# Patient Record
Sex: MALE | Race: WHITE | Employment: UNEMPLOYED | URBAN - METROPOLITAN AREA
[De-identification: names, ages, dates, MRNs, and addresses within clinical notes are randomized per-mention and may not be internally consistent; named-entity substitution may affect disease eponyms.]

---

## 2017-08-16 ENCOUNTER — OFFICE VISIT (OUTPATIENT)
Dept: INTERNAL MEDICINE CLINIC | Age: 82
End: 2017-08-16

## 2017-08-16 VITALS
WEIGHT: 214 LBS | HEART RATE: 68 BPM | BODY MASS INDEX: 29.02 KG/M2 | DIASTOLIC BLOOD PRESSURE: 80 MMHG | TEMPERATURE: 98.3 F | SYSTOLIC BLOOD PRESSURE: 124 MMHG

## 2017-08-16 DIAGNOSIS — J06.9 URI WITH COUGH AND CONGESTION: Primary | ICD-10-CM

## 2017-08-16 PROCEDURE — G8419 CALC BMI OUT NRM PARAM NOF/U: HCPCS | Performed by: INTERNAL MEDICINE

## 2017-08-16 PROCEDURE — G8428 CUR MEDS NOT DOCUMENT: HCPCS | Performed by: INTERNAL MEDICINE

## 2017-08-16 PROCEDURE — 99213 OFFICE O/P EST LOW 20 MIN: CPT | Performed by: INTERNAL MEDICINE

## 2017-08-16 PROCEDURE — 1123F ACP DISCUSS/DSCN MKR DOCD: CPT | Performed by: INTERNAL MEDICINE

## 2017-08-16 PROCEDURE — 4040F PNEUMOC VAC/ADMIN/RCVD: CPT | Performed by: INTERNAL MEDICINE

## 2017-08-16 PROCEDURE — 1036F TOBACCO NON-USER: CPT | Performed by: INTERNAL MEDICINE

## 2017-08-16 RX ORDER — FLUTICASONE PROPIONATE 50 MCG
2 SPRAY, SUSPENSION (ML) NASAL DAILY
Qty: 1 BOTTLE | Refills: 3 | Status: SHIPPED | OUTPATIENT
Start: 2017-08-16 | End: 2017-12-22 | Stop reason: SDUPTHER

## 2017-08-16 RX ORDER — GUAIFENESIN 600 MG/1
600 TABLET, EXTENDED RELEASE ORAL 2 TIMES DAILY
COMMUNITY
Start: 2017-08-16 | End: 2017-12-22 | Stop reason: SDUPTHER

## 2017-08-16 RX ORDER — DEXTROMETHORPHAN HYDROBROMIDE AND PROMETHAZINE HYDROCHLORIDE 15; 6.25 MG/5ML; MG/5ML
5 SYRUP ORAL EVERY 4 HOURS PRN
Qty: 120 ML | Refills: 0 | Status: SHIPPED | OUTPATIENT
Start: 2017-08-16 | End: 2017-08-23

## 2017-12-22 ENCOUNTER — OFFICE VISIT (OUTPATIENT)
Dept: INTERNAL MEDICINE CLINIC | Age: 82
End: 2017-12-22

## 2017-12-22 VITALS
DIASTOLIC BLOOD PRESSURE: 66 MMHG | BODY MASS INDEX: 29.84 KG/M2 | WEIGHT: 220 LBS | SYSTOLIC BLOOD PRESSURE: 130 MMHG | TEMPERATURE: 99.3 F | HEART RATE: 65 BPM

## 2017-12-22 DIAGNOSIS — J01.90 ACUTE SINUSITIS, RECURRENCE NOT SPECIFIED, UNSPECIFIED LOCATION: Primary | ICD-10-CM

## 2017-12-22 DIAGNOSIS — J40 BRONCHITIS: ICD-10-CM

## 2017-12-22 PROCEDURE — 1123F ACP DISCUSS/DSCN MKR DOCD: CPT | Performed by: INTERNAL MEDICINE

## 2017-12-22 PROCEDURE — G8427 DOCREV CUR MEDS BY ELIG CLIN: HCPCS | Performed by: INTERNAL MEDICINE

## 2017-12-22 PROCEDURE — 4040F PNEUMOC VAC/ADMIN/RCVD: CPT | Performed by: INTERNAL MEDICINE

## 2017-12-22 PROCEDURE — 99213 OFFICE O/P EST LOW 20 MIN: CPT | Performed by: INTERNAL MEDICINE

## 2017-12-22 PROCEDURE — 1036F TOBACCO NON-USER: CPT | Performed by: INTERNAL MEDICINE

## 2017-12-22 PROCEDURE — G8419 CALC BMI OUT NRM PARAM NOF/U: HCPCS | Performed by: INTERNAL MEDICINE

## 2017-12-22 PROCEDURE — G8484 FLU IMMUNIZE NO ADMIN: HCPCS | Performed by: INTERNAL MEDICINE

## 2017-12-22 RX ORDER — HYDROCODONE POLISTIREX AND CHLORPHENIRAMINE POLISTIREX 10; 8 MG/5ML; MG/5ML
5 SUSPENSION, EXTENDED RELEASE ORAL EVERY 12 HOURS PRN
Qty: 60 ML | Refills: 0 | Status: SHIPPED | OUTPATIENT
Start: 2017-12-22 | End: 2017-12-28

## 2017-12-22 RX ORDER — DOXYCYCLINE HYCLATE 100 MG
100 TABLET ORAL 2 TIMES DAILY
Qty: 20 TABLET | Refills: 0 | Status: SHIPPED | OUTPATIENT
Start: 2017-12-22 | End: 2018-01-01

## 2017-12-22 RX ORDER — GUAIFENESIN, PSEUDOEPHEDRINE HYDROCHLORIDE 600; 60 MG/1; MG/1
1 TABLET, EXTENDED RELEASE ORAL 2 TIMES DAILY
Refills: 1 | COMMUNITY
Start: 2017-12-22 | End: 2017-12-29

## 2017-12-22 RX ORDER — ECHINACEA PURPUREA EXTRACT 125 MG
3 TABLET ORAL 3 TIMES DAILY
Qty: 1 BOTTLE | Refills: 3 | COMMUNITY
Start: 2017-12-22 | End: 2018-11-19

## 2017-12-22 RX ORDER — FLUTICASONE PROPIONATE 50 MCG
2 SPRAY, SUSPENSION (ML) NASAL DAILY
Qty: 1 BOTTLE | Refills: 3 | COMMUNITY
Start: 2017-12-22 | End: 2018-11-19

## 2017-12-22 NOTE — PROGRESS NOTES
[facility]) Physicians  Internal Medicine  Patient Encounter  Sha Elizondo D.O., [place] [facility]        Chief Complaint   Patient presents with    Cough    Chest Congestion       HPI: 80 y.o. male who was originally scheduled for his Medicare annual wellness visit is seen today with complaint of cough and chest congestion. Symptoms started 1 week ago initially with a sore throat, sneezing, runny nose, nasal congestion. He started coughing a few days later. He is coughing up a light brown, thick sputum. He does report associated rattling in the chest particularly at night in bed. He denies fever or chills. He tried Mucinex D, Flonase, Robitussin DM. Past Medical History:   Diagnosis Date    Basal cell cancer     nose, face    Cataract     Colon polyps     Dilated aortic root (HCC)     Hyperlipidemia     Hypertension     Lumbar disc disease     Lumbar spinal stenosis     Lumbar spondylosis     Prostate cancer ([facility] [address] 4/1997    Renal cancer ([facility] [address] 11/2009    Sleep disturbance     Thoracic aneurysm     Found to be NL on CT scan 2012. Review of Systems - As per HPI      OBJECTIVE:  /66   Pulse 65   Temp 99.3 °F (37.4 °C)   Wt 220 lb (99.8 kg)   BMI 29.84 kg/m²   GEN: NAD, A&O, Non-toxic  HEENT: NC/AT,  TM's NL. Nasal mucosal congestion with a cloudy mucoid discharge noted on the left. No epistaxis. Oral cavity is clear with moist mucous membranes. No pharyngeal exudates. + postnasal drainage. NECK: Supple. No thyromegaly. LYMPH: No C/SC nodes. CV: S1 S2 NL, RRR. No murmurs, clicks or rubs. PULM: CTA, symmentric air exchange. No crackles or wheezing. Moist cough. EXT: No edema. ASSESSMENT[de-identified]  Asenat Dus was seen today for cough and chest congestion. Diagnoses and all orders for this visit:    Acute sinusitis, recurrence not specified, unspecified location  -     pseudoephedrine-guaiFENesin (MUCINEX D)  MG per extended release tablet;  Take 1 tablet by mouth 2 times daily for 7 days  -     fluticasone (FLONASE) 50 MCG/ACT nasal spray; 2 sprays by Nasal route daily  -     sodium chloride (OCEAN) 0.65 % nasal spray; 3 sprays by Nasal route three times daily  -     doxycycline hyclate (VIBRA-TABS) 100 MG tablet; Take 1 tablet by mouth 2 times daily for 10 days    Bronchitis  -     pseudoephedrine-guaiFENesin (MUCINEX D)  MG per extended release tablet; Take 1 tablet by mouth 2 times daily for 7 days  -     doxycycline hyclate (VIBRA-TABS) 100 MG tablet; Take 1 tablet by mouth 2 times daily for 10 days  -     hydrocodone-chlorpheniramine ([facility]) 10-8 MG/5ML SUER; Take 5 mLs by mouth every 12 hours as needed (cough) . Additional Plan:  1. Advised patient to stay well hydrated and to monitor blood pressure while on the Mucinex D  2. Cautioned patient on the potential adverse side effects of the Tussionex cough syrup along with the habit forming nature of the medication. We also discussed the potential for misuse and abuse. 3.  Return for Medicare annual wellness visit      Discussed medications with patient who voiced understanding of their use, indication and potential side effects. Pt also understands the above recommendations. All questions answered.

## 2017-12-22 NOTE — PATIENT INSTRUCTIONS
teaspoon of salt and 1 teaspoon of baking soda to 2 cups of distilled water. If you make your own, fill a bulb syringe with the solution, insert the tip into your nostril, and squeeze gently. Macy Lark your nose. · Put a hot, wet towel or a warm gel pack on your face 3 or 4 times a day for 5 to 10 minutes each time. · Try a decongestant nasal spray like oxymetazoline (Afrin). Do not use it for more than 3 days in a row. Using it for more than 3 days can make your congestion worse. When should you call for help? Call your doctor now or seek immediate medical care if:  ? · You have new or worse swelling or redness in your face or around your eyes. ? · You have a new or higher fever. ? Watch closely for changes in your health, and be sure to contact your doctor if:  ? · You have new or worse facial pain. ? · The mucus from your nose becomes thicker (like pus) or has new blood in it. ? · You are not getting better as expected. Where can you learn more? Go to [email or web address] Inventory [email or web address] and sign in to your Wonga account. Enter J705 in the Bar Saint box to learn more about \"Sinusitis: Care Instructions. \"     If you do not have an account, please click on the \"Sign Up Now\" link. Current as of: May 12, 2017  Content Version: 11.4  © 7492-2979 [employer]atch Resources. Care instructions adapted under license by [facility] ([facility]). If you have questions about a medical condition or this instruction, always ask your healthcare professional. [place] [address] any warranty or liability for your use of this information. Patient Education        Bronchitis: Care Instructions  Your Care Instructions    Bronchitis is inflammation of the bronchial tubes, which carry air to the lungs. The tubes swell and produce mucus, or phlegm. The mucus and inflamed bronchial tubes make you cough. You may have trouble breathing.   Most cases of bronchitis are caused by viruses like those that cause colds. Antibiotics usually do not help and they may be harmful. Bronchitis usually develops rapidly and lasts about 2 to 3 weeks in otherwise healthy people. Follow-up care is a key part of your treatment and safety. Be sure to make and go to all appointments, and call your doctor if you are having problems. It's also a good idea to know your test results and keep a list of the medicines you take. How can you care for yourself at home? · Take all medicines exactly as prescribed. Call your doctor if you think you are having a problem with your medicine. · Get some extra rest.  · Take an over-the-counter pain medicine, such as acetaminophen (Tylenol), ibuprofen (Advil, Motrin), or naproxen (Aleve) to reduce fever and relieve body aches. Read and follow all instructions on the label. · Do not take two or more pain medicines at the same time unless the doctor told you to. Many pain medicines have acetaminophen, which is Tylenol. Too much acetaminophen (Tylenol) can be harmful. · Take an over-the-counter cough medicine that contains dextromethorphan to help quiet a dry, hacking cough so that you can sleep. Avoid cough medicines that have more than one active ingredient. Read and follow all instructions on the label. · Breathe moist air from a humidifier, hot shower, or sink filled with hot water. The heat and moisture will thin mucus so you can cough it out. · Do not smoke. Smoking can make bronchitis worse. If you need help quitting, talk to your doctor about stop-smoking programs and medicines. These can increase your chances of quitting for good. When should you call for help? Call 911 anytime you think you may need emergency care. For example, call if:  ? · You have severe trouble breathing. ?Call your doctor now or seek immediate medical care if:  ? · You have new or worse trouble breathing. ? · You cough up dark brown or bloody mucus (sputum). ? · You have a new or higher fever.    ? · You have a new rash. ? Watch closely for changes in your health, and be sure to contact your doctor if:  ? · You cough more deeply or more often, especially if you notice more mucus or a change in the color of your mucus. ? · You are not getting better as expected. Where can you learn more? Go to [email or web address] and sign in to your Ecoviate account. Enter H333 in the Constant Insight box to learn more about \"Bronchitis: Care Instructions. \"     If you do not have an account, please click on the \"Sign Up Now\" link. Current as of: May 12, 2017  Content Version: 11.4  © 5089-5867 Healthwise, Incorporated. Care instructions adapted under license by [facility] ([facility]). If you have questions about a medical condition or this instruction, always ask your healthcare professional. [address] any warranty or liability for your use of this information.

## 2018-03-06 RX ORDER — AMLODIPINE BESYLATE 5 MG/1
TABLET ORAL
Qty: 90 TABLET | Refills: 3 | Status: SHIPPED | OUTPATIENT
Start: 2018-03-06 | End: 2019-02-02 | Stop reason: SDUPTHER

## 2018-04-23 ENCOUNTER — TELEPHONE (OUTPATIENT)
Dept: INTERNAL MEDICINE CLINIC | Age: 83
End: 2018-04-23

## 2018-05-01 ENCOUNTER — OFFICE VISIT (OUTPATIENT)
Dept: INTERNAL MEDICINE CLINIC | Age: 83
End: 2018-05-01

## 2018-05-01 ENCOUNTER — TELEPHONE (OUTPATIENT)
Dept: INTERNAL MEDICINE CLINIC | Age: 83
End: 2018-05-01

## 2018-05-01 VITALS — DIASTOLIC BLOOD PRESSURE: 80 MMHG | SYSTOLIC BLOOD PRESSURE: 140 MMHG

## 2018-05-01 DIAGNOSIS — M48.062 SPINAL STENOSIS OF LUMBAR REGION WITH NEUROGENIC CLAUDICATION: ICD-10-CM

## 2018-05-01 DIAGNOSIS — M54.16 LUMBAR RADICULOPATHY: Primary | ICD-10-CM

## 2018-05-01 DIAGNOSIS — Z85.46 HISTORY OF PROSTATE CANCER: ICD-10-CM

## 2018-05-01 DIAGNOSIS — Z85.528 HISTORY OF KIDNEY CANCER: ICD-10-CM

## 2018-05-01 DIAGNOSIS — M51.36 DDD (DEGENERATIVE DISC DISEASE), LUMBAR: ICD-10-CM

## 2018-05-01 PROCEDURE — 4040F PNEUMOC VAC/ADMIN/RCVD: CPT | Performed by: INTERNAL MEDICINE

## 2018-05-01 PROCEDURE — G8419 CALC BMI OUT NRM PARAM NOF/U: HCPCS | Performed by: INTERNAL MEDICINE

## 2018-05-01 PROCEDURE — G8428 CUR MEDS NOT DOCUMENT: HCPCS | Performed by: INTERNAL MEDICINE

## 2018-05-01 PROCEDURE — 1036F TOBACCO NON-USER: CPT | Performed by: INTERNAL MEDICINE

## 2018-05-01 PROCEDURE — 99213 OFFICE O/P EST LOW 20 MIN: CPT | Performed by: INTERNAL MEDICINE

## 2018-05-01 PROCEDURE — 1123F ACP DISCUSS/DSCN MKR DOCD: CPT | Performed by: INTERNAL MEDICINE

## 2018-05-10 ENCOUNTER — TELEPHONE (OUTPATIENT)
Dept: INTERNAL MEDICINE CLINIC | Age: 83
End: 2018-05-10

## 2018-05-10 DIAGNOSIS — M54.16 LUMBAR RADICULOPATHY: ICD-10-CM

## 2018-05-10 DIAGNOSIS — Z85.528 HISTORY OF KIDNEY CANCER: ICD-10-CM

## 2018-05-10 DIAGNOSIS — M51.36 DDD (DEGENERATIVE DISC DISEASE), LUMBAR: ICD-10-CM

## 2018-05-10 DIAGNOSIS — M48.062 SPINAL STENOSIS OF LUMBAR REGION WITH NEUROGENIC CLAUDICATION: ICD-10-CM

## 2018-05-10 DIAGNOSIS — Z85.46 HISTORY OF PROSTATE CANCER: ICD-10-CM

## 2018-05-11 DIAGNOSIS — M51.36 DDD (DEGENERATIVE DISC DISEASE), LUMBAR: ICD-10-CM

## 2018-05-11 DIAGNOSIS — M48.061 LUMBAR FORAMINAL STENOSIS: ICD-10-CM

## 2018-05-11 DIAGNOSIS — M47.816 LUMBAR FACET ARTHROPATHY: ICD-10-CM

## 2018-05-11 DIAGNOSIS — M54.17 LUMBOSACRAL RADICULOPATHY AT L3: Primary | ICD-10-CM

## 2018-07-10 ENCOUNTER — OFFICE VISIT (OUTPATIENT)
Dept: INTERNAL MEDICINE CLINIC | Age: 83
End: 2018-07-10

## 2018-07-10 VITALS
OXYGEN SATURATION: 97 % | HEART RATE: 62 BPM | DIASTOLIC BLOOD PRESSURE: 70 MMHG | WEIGHT: 215 LBS | BODY MASS INDEX: 29.16 KG/M2 | SYSTOLIC BLOOD PRESSURE: 132 MMHG

## 2018-07-10 DIAGNOSIS — E78.5 HYPERLIPIDEMIA, UNSPECIFIED HYPERLIPIDEMIA TYPE: ICD-10-CM

## 2018-07-10 DIAGNOSIS — Z23 NEED FOR SHINGLES VACCINE: ICD-10-CM

## 2018-07-10 DIAGNOSIS — K21.9 GASTROESOPHAGEAL REFLUX DISEASE WITHOUT ESOPHAGITIS: Primary | ICD-10-CM

## 2018-07-10 DIAGNOSIS — R13.19 ESOPHAGEAL DYSPHAGIA: ICD-10-CM

## 2018-07-10 DIAGNOSIS — K59.00 CONSTIPATION, UNSPECIFIED CONSTIPATION TYPE: ICD-10-CM

## 2018-07-10 DIAGNOSIS — I10 BENIGN ESSENTIAL HTN: ICD-10-CM

## 2018-07-10 PROCEDURE — 1123F ACP DISCUSS/DSCN MKR DOCD: CPT | Performed by: INTERNAL MEDICINE

## 2018-07-10 PROCEDURE — G8427 DOCREV CUR MEDS BY ELIG CLIN: HCPCS | Performed by: INTERNAL MEDICINE

## 2018-07-10 PROCEDURE — 1036F TOBACCO NON-USER: CPT | Performed by: INTERNAL MEDICINE

## 2018-07-10 PROCEDURE — 99214 OFFICE O/P EST MOD 30 MIN: CPT | Performed by: INTERNAL MEDICINE

## 2018-07-10 PROCEDURE — 4040F PNEUMOC VAC/ADMIN/RCVD: CPT | Performed by: INTERNAL MEDICINE

## 2018-07-10 PROCEDURE — G8510 SCR DEP NEG, NO PLAN REQD: HCPCS | Performed by: INTERNAL MEDICINE

## 2018-07-10 PROCEDURE — G8419 CALC BMI OUT NRM PARAM NOF/U: HCPCS | Performed by: INTERNAL MEDICINE

## 2018-07-10 PROCEDURE — 3288F FALL RISK ASSESSMENT DOCD: CPT | Performed by: INTERNAL MEDICINE

## 2018-07-10 RX ORDER — DOCUSATE SODIUM 100 MG/1
200 CAPSULE, LIQUID FILLED ORAL DAILY
COMMUNITY
Start: 2018-07-10 | End: 2020-12-04

## 2018-07-10 RX ORDER — FAMOTIDINE 20 MG/1
40 TABLET, FILM COATED ORAL DAILY
Qty: 60 TABLET | Refills: 3 | Status: SHIPPED | OUTPATIENT
Start: 2018-07-10 | End: 2018-11-19 | Stop reason: ALTCHOICE

## 2018-07-10 ASSESSMENT — PATIENT HEALTH QUESTIONNAIRE - PHQ9
SUM OF ALL RESPONSES TO PHQ9 QUESTIONS 1 & 2: 0
1. LITTLE INTEREST OR PLEASURE IN DOING THINGS: 0
SUM OF ALL RESPONSES TO PHQ QUESTIONS 1-9: 0
2. FEELING DOWN, DEPRESSED OR HOPELESS: 0

## 2018-07-10 NOTE — PROGRESS NOTES
hepatomegaly. NEURO: No focal or lateralizing deficits. VASC:  No carotid bruits. Pulses symmetric    ASSESSMENT[de-identified]  Ricardo Kerr was seen today for gastroesophageal reflux, constipation and other. Diagnoses and all orders for this visit:    Gastroesophageal reflux disease without esophagitis  -     Ambulatory referral to Gastroenterology  -     famotidine (PEPCID) 20 MG tablet; Take 2 tablets by mouth daily    Esophageal dysphagia  -     Ambulatory referral to Gastroenterology  -     famotidine (PEPCID) 20 MG tablet; Take 2 tablets by mouth daily    Benign essential HTN  -     CBC Auto Differential; Future  -     Comprehensive Metabolic Panel; Future  -     Lipid Panel; Future  -     TSH without Reflex; Future    Need for shingles vaccine  -     zoster recombinant adjuvanted vaccine (SHINGRIX) 50 MCG SUSR injection; Inject 0.5 mLs into the muscle once for 1 dose . Repeat in 2-6 months    Constipation, unspecified constipation type  -     docusate sodium (COLACE) 100 MG capsule; Take 2 capsules by mouth daily  -     TSH without Reflex; Future    Hyperlipidemia, unspecified hyperlipidemia type  -     CBC Auto Differential; Future  -     Comprehensive Metabolic Panel; Future  -     Lipid Panel; Future  -     TSH without Reflex; Future        ? Colonoscopy    Discussed medications with patient who voiced understanding of their use, indication and potential side effects. Pt also understands the above recommendations. All questions answered.

## 2018-07-10 NOTE — PATIENT INSTRUCTIONS
Patient Education        Constipation: Care Instructions  Your Care Instructions    Constipation means that you have a hard time passing stools (bowel movements). People pass stools from 3 times a day to once every 3 days. What is normal for you may be different. Constipation may occur with pain in the rectum and cramping. The pain may get worse when you try to pass stools. Sometimes there are small amounts of bright red blood on toilet paper or the surface of stools. This is because of enlarged veins near the rectum (hemorrhoids). A few changes in your diet and lifestyle may help you avoid ongoing constipation. Your doctor may also prescribe medicine to help loosen your stool. Some medicines can cause constipation. These include pain medicines and antidepressants. Tell your doctor about all the medicines you take. Your doctor may want to make a medicine change to ease your symptoms. Follow-up care is a key part of your treatment and safety. Be sure to make and go to all appointments, and call your doctor if you are having problems. It's also a good idea to know your test results and keep a list of the medicines you take. How can you care for yourself at home? · Drink plenty of fluids, enough so that your urine is light yellow or clear like water. If you have kidney, heart, or liver disease and have to limit fluids, talk with your doctor before you increase the amount of fluids you drink. · Include high-fiber foods in your diet each day. These include fruits, vegetables, beans, and whole grains. · Get at least 30 minutes of exercise on most days of the week. Walking is a good choice. You also may want to do other activities, such as running, swimming, cycling, or playing tennis or team sports. · Take a fiber supplement, such as Citrucel or Metamucil, every day. Read and follow all instructions on the label. · Schedule time each day for a bowel movement. A daily routine may help.  Take your time having your bowel movement. · Support your feet with a small step stool when you sit on the toilet. This helps flex your hips and places your pelvis in a squatting position. · Your doctor may recommend an over-the-counter laxative to relieve your constipation. Examples are Milk of Magnesia and MiraLax. Read and follow all instructions on the label. Do not use laxatives on a long-term basis. When should you call for help? Call your doctor now or seek immediate medical care if:    · You have new or worse belly pain.     · You have new or worse nausea or vomiting.     · You have blood in your stools.    Watch closely for changes in your health, and be sure to contact your doctor if:    · Your constipation is getting worse.     · You do not get better as expected. Where can you learn more? Go to [email or web address]://[email or web address].[email or web address] and sign in to your Idibon account. Enter [phone] in the Lombardi Software box to learn more about \"Constipation: Care Instructions. \"     If you do not have an account, please click on the \"Sign Up Now\" link. Current as of: November 20, 2017  Content Version: 11.6  © 0157-6901 Backupify. Care instructions adapted under license by [facility] ([facility]). If you have questions about a medical condition or this instruction, always ask your healthcare professional. [place] [address] any warranty or liability for your use of this information. Patient Education        Learning About Swallowing Problems  What are swallowing problems? Certain health problems that affect the nervous system can cause trouble swallowing. These conditions include stroke, ALS (also known as Amaya Gehrig's disease), Parkinson's disease, and multiple sclerosis. The muscles and nerves that help move food through the throat and esophagus may not work right. Growths, such as cancer, and other problems with your esophagus can also make it hard to swallow.  The esophagus is the tube that leads frequent meals. · Tip your chin down when there is food in your mouth. Where can you learn more? Go to https://chpepiceweb.[email or web address] org and sign in to your Compliance Control account. Enter M570 in the The Daily Caller box to learn more about \"Learning About Swallowing Problems. \"     If you do not have an account, please click on the \"Sign Up Now\" link. Current as of: November 20, 2017  Content Version: 11.6  © 1514-7490 [facility]. Care instructions adapted under license by [facility] ([facility]). If you have questions about a medical condition or this instruction, always ask your healthcare professional. [place] [address] any warranty or liability for your use of this information. Patient Education        Gastroesophageal Reflux Disease (GERD): Care Instructions  Your Care Instructions    Gastroesophageal reflux disease (GERD) is the backward flow of stomach acid into the esophagus. The esophagus is the tube that leads from your throat to your stomach. A one-way valve prevents the stomach acid from moving up into this tube. When you have GERD, this valve does not close tightly enough. If you have mild GERD symptoms including heartburn, you may be able to control the problem with antacids or over-the-counter medicine. Changing your diet, losing weight, and making other lifestyle changes can also help reduce symptoms. Follow-up care is a key part of your treatment and safety. Be sure to make and go to all appointments, and call your doctor if you are having problems. It's also a good idea to know your test results and keep a list of the medicines you take. How can you care for yourself at home? · Take your medicines exactly as prescribed. Call your doctor if you think you are having a problem with your medicine. · Your doctor may recommend over-the-counter medicine. For mild or occasional indigestion, antacids, such as Tums, Gaviscon, Mylanta, or Maalox, may help.  Your doctor 2017  Content Version: 11.6  © 0771-7028 M-SIX, Incorporated. Care instructions adapted under license by [facility] ([facility]). If you have questions about a medical condition or this instruction, always ask your healthcare professional. [address] any warranty or liability for your use of this information.

## 2018-07-11 DIAGNOSIS — K59.00 CONSTIPATION, UNSPECIFIED CONSTIPATION TYPE: ICD-10-CM

## 2018-07-11 DIAGNOSIS — E78.5 HYPERLIPIDEMIA, UNSPECIFIED HYPERLIPIDEMIA TYPE: ICD-10-CM

## 2018-07-11 DIAGNOSIS — I10 BENIGN ESSENTIAL HTN: ICD-10-CM

## 2018-07-11 LAB
A/G RATIO: 2 (ref 1.1–2.2)
ALBUMIN SERPL-MCNC: 4.4 G/DL (ref 3.4–5)
ALP BLD-CCNC: 55 U/L (ref 40–129)
ALT SERPL-CCNC: 16 U/L (ref 10–40)
ANION GAP SERPL CALCULATED.3IONS-SCNC: 10 MMOL/L (ref 3–16)
AST SERPL-CCNC: 18 U/L (ref 15–37)
BASOPHILS ABSOLUTE: 0 K/UL (ref 0–0.2)
BASOPHILS RELATIVE PERCENT: 0.7 %
BILIRUB SERPL-MCNC: 0.8 MG/DL (ref 0–1)
BUN BLDV-MCNC: 14 MG/DL (ref 7–20)
CALCIUM SERPL-MCNC: 9.5 MG/DL (ref 8.3–10.6)
CHLORIDE BLD-SCNC: 99 MMOL/L (ref 99–110)
CHOLESTEROL, TOTAL: 228 MG/DL (ref 0–199)
CO2: 29 MMOL/L (ref 21–32)
CREAT SERPL-MCNC: 0.9 MG/DL (ref 0.8–1.3)
EOSINOPHILS ABSOLUTE: 0.2 K/UL (ref 0–0.6)
EOSINOPHILS RELATIVE PERCENT: 3.4 %
GFR AFRICAN AMERICAN: >60
GFR NON-AFRICAN AMERICAN: >60
GLOBULIN: 2.2 G/DL
GLUCOSE BLD-MCNC: 91 MG/DL (ref 70–99)
HCT VFR BLD CALC: 40.9 % (ref 40.5–52.5)
HDLC SERPL-MCNC: 61 MG/DL (ref 40–60)
HEMOGLOBIN: 14.3 G/DL (ref 13.5–17.5)
LDL CHOLESTEROL CALCULATED: 141 MG/DL
LYMPHOCYTES ABSOLUTE: 2 K/UL (ref 1–5.1)
LYMPHOCYTES RELATIVE PERCENT: 28.5 %
MCH RBC QN AUTO: 32.4 PG (ref 26–34)
MCHC RBC AUTO-ENTMCNC: 35 G/DL (ref 31–36)
MCV RBC AUTO: 92.6 FL (ref 80–100)
MONOCYTES ABSOLUTE: 0.5 K/UL (ref 0–1.3)
MONOCYTES RELATIVE PERCENT: 6.8 %
NEUTROPHILS ABSOLUTE: 4.4 K/UL (ref 1.7–7.7)
NEUTROPHILS RELATIVE PERCENT: 60.6 %
PDW BLD-RTO: 13.2 % (ref 12.4–15.4)
PLATELET # BLD: NORMAL K/UL (ref 135–450)
PLATELET SLIDE REVIEW: NORMAL
PMV BLD AUTO: NORMAL FL (ref 5–10.5)
POTASSIUM SERPL-SCNC: 4.6 MMOL/L (ref 3.5–5.1)
RBC # BLD: 4.42 M/UL (ref 4.2–5.9)
SLIDE REVIEW: NORMAL
SODIUM BLD-SCNC: 138 MMOL/L (ref 136–145)
TOTAL PROTEIN: 6.6 G/DL (ref 6.4–8.2)
TRIGL SERPL-MCNC: 130 MG/DL (ref 0–150)
TSH SERPL DL<=0.05 MIU/L-ACNC: 2.17 UIU/ML (ref 0.27–4.2)
VLDLC SERPL CALC-MCNC: 26 MG/DL
WBC # BLD: 7.2 K/UL (ref 4–11)

## 2018-11-19 ENCOUNTER — OFFICE VISIT (OUTPATIENT)
Dept: INTERNAL MEDICINE CLINIC | Age: 83
End: 2018-11-19
Payer: MEDICARE

## 2018-11-19 ENCOUNTER — TELEPHONE (OUTPATIENT)
Dept: INTERNAL MEDICINE CLINIC | Age: 83
End: 2018-11-19

## 2018-11-19 VITALS — WEIGHT: 219 LBS | SYSTOLIC BLOOD PRESSURE: 120 MMHG | BODY MASS INDEX: 29.7 KG/M2 | DIASTOLIC BLOOD PRESSURE: 80 MMHG

## 2018-11-19 DIAGNOSIS — E78.5 HYPERLIPIDEMIA, UNSPECIFIED HYPERLIPIDEMIA TYPE: ICD-10-CM

## 2018-11-19 DIAGNOSIS — I71.20 THORACIC AORTIC ANEURYSM WITHOUT RUPTURE: ICD-10-CM

## 2018-11-19 DIAGNOSIS — I10 BENIGN ESSENTIAL HTN: Primary | ICD-10-CM

## 2018-11-19 DIAGNOSIS — K22.2 ESOPHAGEAL STRICTURE: ICD-10-CM

## 2018-11-19 DIAGNOSIS — K21.9 GASTROESOPHAGEAL REFLUX DISEASE WITHOUT ESOPHAGITIS: ICD-10-CM

## 2018-11-19 PROCEDURE — 1101F PT FALLS ASSESS-DOCD LE1/YR: CPT | Performed by: INTERNAL MEDICINE

## 2018-11-19 PROCEDURE — 99214 OFFICE O/P EST MOD 30 MIN: CPT | Performed by: INTERNAL MEDICINE

## 2018-11-19 PROCEDURE — 1123F ACP DISCUSS/DSCN MKR DOCD: CPT | Performed by: INTERNAL MEDICINE

## 2018-11-19 PROCEDURE — G8484 FLU IMMUNIZE NO ADMIN: HCPCS | Performed by: INTERNAL MEDICINE

## 2018-11-19 PROCEDURE — G8419 CALC BMI OUT NRM PARAM NOF/U: HCPCS | Performed by: INTERNAL MEDICINE

## 2018-11-19 PROCEDURE — 1036F TOBACCO NON-USER: CPT | Performed by: INTERNAL MEDICINE

## 2018-11-19 PROCEDURE — 4040F PNEUMOC VAC/ADMIN/RCVD: CPT | Performed by: INTERNAL MEDICINE

## 2018-11-19 PROCEDURE — G8427 DOCREV CUR MEDS BY ELIG CLIN: HCPCS | Performed by: INTERNAL MEDICINE

## 2018-11-19 RX ORDER — PANTOPRAZOLE SODIUM 40 MG/1
40 TABLET, DELAYED RELEASE ORAL DAILY
COMMUNITY
End: 2018-11-19 | Stop reason: DRUGHIGH

## 2018-11-19 RX ORDER — PANTOPRAZOLE SODIUM 40 MG/1
40 TABLET, DELAYED RELEASE ORAL 2 TIMES DAILY
Qty: 30 TABLET | Status: SHIPPED | COMMUNITY
Start: 2018-11-19 | End: 2019-07-10

## 2018-11-19 NOTE — PROGRESS NOTES
Date    Basal cell cancer     nose, face    Cataract     Colon polyps     Dilated aortic root (HCC)     Gastroesophageal reflux disease without esophagitis 11/19/2018    Hyperlipidemia     Hypertension     Lumbar disc disease     Lumbar spinal stenosis     Lumbar spondylosis     Prostate cancer ([facility] [address] 4/1997    Renal cancer ([facility] 11/2009    Sleep disturbance     Thoracic aneurysm     Found to be NL on CT scan 2012. Prior to Visit Medications    Medication Sig Taking? Authorizing Provider   pantoprazole (PROTONIX) 40 MG tablet Take 1 tablet by mouth 2 times daily Yes Mikhail Gallardo, DO   docusate sodium (COLACE) 100 MG capsule Take 2 capsules by mouth daily Yes Mikhail Gallardo, DO   amLODIPine (NORVASC) 5 MG tablet TAKE 1 TABLET BY MOUTH  DAILY Yes Mikhail Gallardo, DO   aspirin 81 MG EC tablet Take 81 mg by mouth daily. Yes Historical Provider, MD           Review of Systems - As per HPI  GEN: Pt denies fever, chills or night sweats. Denies weight changes. Appetite good  HEENT: Pt denies visual and auditory changes, epistaxis, upper respiratory symptoms and dysphagia  CV: Pt denies CP, SOB, LEHMAN, orthopnea palpitations, LE swelling, and claudication. PULM: Pt denies cough, wheezing or sputum production  GI: Pt denies N/V/D, heart burn, rectal bleeding, or melena. NEURO: Pt denies unilateral weakness, paresthesia and dizziness. OBJECTIVE:  /80   Wt 219 lb (99.3 kg)   BMI 29.70 kg/m²   GEN: NAD, A&O, Non-toxic  HEENT: NC/AT, SOREN, EOMI, Oral cavity Clear,  TM's NL, Nasal cavity clear. NECK: Supple. No thyromegaly. LYMPH: No C/SC nodes. CV: S1 S2 NL, RRR. No murmurs, clicks or rubs. PULM: CTA, symmentric air exchange  EXT: No C/C/E  GI: Abdomen is soft, NT, BS+, No hepatomegaly. NEURO: No focal or lateralizing deficits. VASC:  No carotid bruits. Pulses symmetric    ASSESSMENT/PLAN:    1.  Benign essential HTN  Blood pressure is well controlled  Continue amlodipine  Stay

## 2019-04-03 ENCOUNTER — OFFICE VISIT (OUTPATIENT)
Dept: INTERNAL MEDICINE CLINIC | Age: 84
End: 2019-04-03
Payer: MEDICARE

## 2019-04-03 VITALS — HEART RATE: 62 BPM | DIASTOLIC BLOOD PRESSURE: 70 MMHG | TEMPERATURE: 98.2 F | SYSTOLIC BLOOD PRESSURE: 144 MMHG

## 2019-04-03 DIAGNOSIS — M48.061 LUMBAR FORAMINAL STENOSIS: ICD-10-CM

## 2019-04-03 DIAGNOSIS — K22.2 ESOPHAGEAL STRICTURE: ICD-10-CM

## 2019-04-03 DIAGNOSIS — M51.36 DDD (DEGENERATIVE DISC DISEASE), LUMBAR: ICD-10-CM

## 2019-04-03 DIAGNOSIS — K21.9 GASTROESOPHAGEAL REFLUX DISEASE WITHOUT ESOPHAGITIS: Primary | ICD-10-CM

## 2019-04-03 DIAGNOSIS — M54.17 LUMBOSACRAL RADICULOPATHY AT L3: ICD-10-CM

## 2019-04-03 DIAGNOSIS — M47.816 LUMBAR FACET ARTHROPATHY: ICD-10-CM

## 2019-04-03 DIAGNOSIS — R13.19 ESOPHAGEAL DYSPHAGIA: ICD-10-CM

## 2019-04-03 DIAGNOSIS — I71.20 THORACIC AORTIC ANEURYSM WITHOUT RUPTURE: ICD-10-CM

## 2019-04-03 PROCEDURE — 1036F TOBACCO NON-USER: CPT | Performed by: INTERNAL MEDICINE

## 2019-04-03 PROCEDURE — G8427 DOCREV CUR MEDS BY ELIG CLIN: HCPCS | Performed by: INTERNAL MEDICINE

## 2019-04-03 PROCEDURE — 4040F PNEUMOC VAC/ADMIN/RCVD: CPT | Performed by: INTERNAL MEDICINE

## 2019-04-03 PROCEDURE — 99214 OFFICE O/P EST MOD 30 MIN: CPT | Performed by: INTERNAL MEDICINE

## 2019-04-03 PROCEDURE — G8419 CALC BMI OUT NRM PARAM NOF/U: HCPCS | Performed by: INTERNAL MEDICINE

## 2019-04-03 PROCEDURE — 1123F ACP DISCUSS/DSCN MKR DOCD: CPT | Performed by: INTERNAL MEDICINE

## 2019-04-03 RX ORDER — OMEPRAZOLE AND SODIUM BICARBONATE 40; 1100 MG/1; MG/1
1 CAPSULE ORAL
Qty: 30 CAPSULE | Refills: 3 | Status: SHIPPED | OUTPATIENT
Start: 2019-04-03 | End: 2019-07-10

## 2019-04-03 NOTE — PROGRESS NOTES
upright. He is belching and regurgitating. The Manometry has not been set up. He feels worse since the dilation. He reports water brash, sour and bitter taste in the mouth. On his own, he stopped the Protonix. He took the Zegarid in the morning instead and this helped. Patient states he does not think he is going to go through with the manometry studies. He did undergo a colonoscopy on 10/1/2018. Also, he has additional complaints of right hip pain. Back in May of 2018. He had an CHTEAN. The latter helped with low back and radicular pain. Now, he is having right low back pain and radicular pain again in the right medial leg. He denies groin pain. He had seen Dr. Isha Shay. He has seen Dr. Sinan Oviedo did the procedure. He wants another referral to for another CHETAN. MRI from May 2018 reviewed. [phone]    Past Medical History:   Diagnosis Date    Basal cell cancer     nose, face    Cataract     Colon polyps     Dilated aortic root (HCC)     Gastroesophageal reflux disease without esophagitis 11/19/2018    Hyperlipidemia     Hypertension     Lumbar disc disease     Lumbar spinal stenosis     Lumbar spondylosis     Prostate cancer ([facility] [address] 4/1997    Renal cancer ([facility] [address] 11/2009    Sleep disturbance     Thoracic aneurysm     Found to be NL on CT scan 2012. MEDICATIONS:  Prior to Visit Medications    Medication Sig Taking? Authorizing Provider   omeprazole-sodium bicarbonate (ZEGERID)  MG per capsule Take 1 capsule by mouth every morning (before breakfast) Yes Mikhail Gallardo, DO   amLODIPine (NORVASC) 5 MG tablet TAKE 1 TABLET BY MOUTH  DAILY Yes Mikhail Gallardo DO   docusate sodium (COLACE) 100 MG capsule Take 2 capsules by mouth daily Yes Mikhail Gallardo DO   aspirin 81 MG EC tablet Take 81 mg by mouth daily.  Yes Historical Provider, MD   pantoprazole (PROTONIX) 40 MG tablet Take 1 tablet by mouth 2 times daily  Mikhail Gallardo, DO           Review of Systems - As per rehab for interventional pain management. Consider another epidural injection. 4.  Debility of the thoracic aneurysm is unknown. He is due for a CT scan. He wants to get his GI symptoms under control first    Discussed medications with patient who voiced understanding of their use, indication and potential side effects. Pt also understands the above recommendations. All questions answered.

## 2019-04-03 NOTE — PATIENT INSTRUCTIONS
Follow-up with GI for manometry studies. I strongly encourage you to forward with this evaluation. We are increasing the Zegerid to  one daily in the morning    Follow-up with Dr. Sendy Alves for another epidural injection. You may have to visit with Dr. Roland Jc or Dr. Joanne Alvarado first  Patient Education        Learning About Swallowing Problems  What are swallowing problems? Certain health problems that affect the nervous system can cause trouble swallowing. These conditions include stroke, ALS (also known as Amaya Gehrig's disease), Parkinson's disease, and multiple sclerosis. The muscles and nerves that help move food through the throat and esophagus may not work right. Growths, such as cancer, and other problems with your esophagus can also make it hard to swallow. The esophagus is the tube that leads from your throat to your stomach. How are swallowing problems diagnosed? A doctor or speech therapist will examine you to check for swallowing problems. You may get swallowing tests to check how well your throat muscles work. For these tests, you swallow a special liquid that helps the doctor see your throat and esophagus on an X-ray or video screen. Other tests use a thin, flexible tube called a scope to check for problems with your esophagus. The doctor puts the scope in your mouth and down your throat to look at your esophagus. What are the symptoms? Symptoms of swallowing problems may include:  · Trouble getting food or liquids to go down on the first try. · Gagging, choking, or coughing when you swallow. · Having food or liquids come back up through your throat, mouth, or nose after you swallow. · Feeling like foods or liquids are stuck in some part of your throat or chest.  · Pain when you swallow. How are swallowing problems treated? How swallowing problems are treated depends on the cause. The main goals of treatment will be to help you eat and swallow safely and get good nutrition. that move food through the throat and esophagus are not working right. To help you swallow food, your doctor or speech therapist may advise a special dysphagia diet for you. Why is a special diet important? A dysphagia diet can help you handle some problems that can occur when it's hard to swallow food and liquids easily. These problems can include:  · Malnutrition. This means you aren't getting enough healthy foods to keep your body working well. · Dehydration. This means you aren't getting enough liquids to keep your body healthy. · Aspiration. This means that food, liquid, or saliva goes down your windpipe (trachea) into your lungs, instead of down your esophagus to your stomach. This can lead to aspiration pneumonia, which is an inflammation of the lungs. What is the dysphagia diet? In the dysphagia diet, you change the foods you eat and the liquids you drink to make it easier to swallow them. You may:  · Change the texture of the foods you eat. Your doctor or speech therapist may advise you to eat one of these types of foods:  ? Solid, soft foods that have been cut up into small pieces. Extra gravy or sauce can help make these foods easier to swallow. ? Semi-solid foods, like ground meats or fruits and vegetables that are mashed with a fork. These foods require some chewing. Extra gravy or sauce is often served. ? Foods that are the texture of pudding. You don't have to chew these foods. Examples include mashed potatoes with gravy; yogurt; pudding; and pureed meats, fruits, and vegetables. · Thicken the liquids you drink. Your doctor or speech therapist will tell you what kind of thickener to use and how thick to make the liquids. ? Nectar-thick liquids leave a thin coating when they are poured from a spoon. ? Honey-thick liquids drip slowly when they are poured from a spoon. ? Pudding-thick liquids do not pour from a spoon.   Your speech therapist will help you learn exercises to train your muscles to work together so you can swallow. You may also need to learn how to position your body or how to put food in your mouth to be able to swallow better. Some people have severe trouble swallowing and can't get enough food and liquids. In those cases, the doctor can insert a feeding tube so the person gets enough nutrients. Follow-up care is a key part of your treatment and safety. Be sure to make and go to all appointments, and call your doctor if you are having problems. It's also a good idea to know your test results and keep a list of the medicines you take. Where can you learn more? Go to https://Coho Datapepiceweb.[employer]. org and sign in to your Beestar account. Enter G706 in the TradeCard box to learn more about \"Learning About the Diet for Swallowing Problems. \"     If you do not have an account, please click on the \"Sign Up Now\" link. Current as of: September 23, 2018  Content Version: 11.9  © 5910-7338 [employer]. Care instructions adapted under license by [facility] ([facility]). If you have questions about a medical condition or this instruction, always ask your healthcare professional. [place] [address] any warranty or liability for your use of this information.

## 2019-07-10 ENCOUNTER — TELEPHONE (OUTPATIENT)
Dept: INTERNAL MEDICINE CLINIC | Age: 84
End: 2019-07-10

## 2019-07-10 ENCOUNTER — OFFICE VISIT (OUTPATIENT)
Dept: INTERNAL MEDICINE CLINIC | Age: 84
End: 2019-07-10
Payer: MEDICARE

## 2019-07-10 VITALS
SYSTOLIC BLOOD PRESSURE: 150 MMHG | HEART RATE: 64 BPM | WEIGHT: 205 LBS | BODY MASS INDEX: 27.77 KG/M2 | DIASTOLIC BLOOD PRESSURE: 70 MMHG | RESPIRATION RATE: 14 BRPM | OXYGEN SATURATION: 95 % | HEIGHT: 72 IN

## 2019-07-10 DIAGNOSIS — K22.0 ACHALASIA: ICD-10-CM

## 2019-07-10 DIAGNOSIS — K21.9 GASTROESOPHAGEAL REFLUX DISEASE WITHOUT ESOPHAGITIS: ICD-10-CM

## 2019-07-10 DIAGNOSIS — R19.8 ABDOMINAL FULLNESS: ICD-10-CM

## 2019-07-10 DIAGNOSIS — R42 POSTURAL DIZZINESS: Primary | ICD-10-CM

## 2019-07-10 DIAGNOSIS — Z85.46 HISTORY OF MALIGNANT NEOPLASM OF PROSTATE: ICD-10-CM

## 2019-07-10 DIAGNOSIS — E78.5 HYPERLIPIDEMIA, UNSPECIFIED HYPERLIPIDEMIA TYPE: ICD-10-CM

## 2019-07-10 DIAGNOSIS — Z85.528 HISTORY OF MALIGNANT NEOPLASM OF KIDNEY: ICD-10-CM

## 2019-07-10 DIAGNOSIS — R94.31 ABNORMAL EKG: ICD-10-CM

## 2019-07-10 DIAGNOSIS — D12.6 ADENOMATOUS POLYP OF COLON, UNSPECIFIED PART OF COLON: ICD-10-CM

## 2019-07-10 DIAGNOSIS — I10 BENIGN ESSENTIAL HTN: ICD-10-CM

## 2019-07-10 DIAGNOSIS — I71.21 THORACIC ASCENDING AORTIC ANEURYSM: ICD-10-CM

## 2019-07-10 DIAGNOSIS — I44.0 1ST DEGREE AV BLOCK: ICD-10-CM

## 2019-07-10 DIAGNOSIS — I49.3 PVC (PREMATURE VENTRICULAR CONTRACTION): ICD-10-CM

## 2019-07-10 PROBLEM — M99.83 OTHER BIOMECHANICAL LESIONS OF LUMBAR REGION: Status: ACTIVE | Noted: 2018-05-15

## 2019-07-10 PROBLEM — H26.492 SECONDARY CATARACT OF LEFT EYE WITH VISION OBSCURED: Status: ACTIVE | Noted: 2019-07-10

## 2019-07-10 PROBLEM — D31.31 CHOROIDAL NEVUS, RIGHT EYE: Status: ACTIVE | Noted: 2019-07-10

## 2019-07-10 PROBLEM — M51.37 DDD (DEGENERATIVE DISC DISEASE), LUMBOSACRAL: Status: ACTIVE | Noted: 2019-04-19

## 2019-07-10 PROBLEM — H43.812 VITREOUS DEGENERATION, LEFT EYE: Status: ACTIVE | Noted: 2019-07-10

## 2019-07-10 PROBLEM — M51.379 DDD (DEGENERATIVE DISC DISEASE), LUMBOSACRAL: Status: ACTIVE | Noted: 2019-04-19

## 2019-07-10 PROBLEM — K63.89 SMALL BOWEL POLYP: Status: ACTIVE | Noted: 2019-05-09

## 2019-07-10 PROBLEM — Z96.1 PRESENCE OF INTRAOCULAR LENS: Status: ACTIVE | Noted: 2019-07-10

## 2019-07-10 PROBLEM — H25.12 AGE-RELATED NUCLEAR CATARACT OF LEFT EYE: Status: ACTIVE | Noted: 2019-07-10

## 2019-07-10 PROBLEM — M96.1 POST-LAMINECTOMY SYNDROME: Status: ACTIVE | Noted: 2019-04-19

## 2019-07-10 PROBLEM — H43.822 VITREOMACULAR ADHESION, LEFT EYE: Status: ACTIVE | Noted: 2019-07-10

## 2019-07-10 PROBLEM — K22.2 ESOPHAGEAL STRICTURE: Status: ACTIVE | Noted: 2019-07-10

## 2019-07-10 PROBLEM — H35.81 RETINAL EDEMA: Status: ACTIVE | Noted: 2019-07-10

## 2019-07-10 PROCEDURE — 4040F PNEUMOC VAC/ADMIN/RCVD: CPT | Performed by: INTERNAL MEDICINE

## 2019-07-10 PROCEDURE — 93000 ELECTROCARDIOGRAM COMPLETE: CPT | Performed by: INTERNAL MEDICINE

## 2019-07-10 PROCEDURE — G8427 DOCREV CUR MEDS BY ELIG CLIN: HCPCS | Performed by: INTERNAL MEDICINE

## 2019-07-10 PROCEDURE — 1036F TOBACCO NON-USER: CPT | Performed by: INTERNAL MEDICINE

## 2019-07-10 PROCEDURE — 1123F ACP DISCUSS/DSCN MKR DOCD: CPT | Performed by: INTERNAL MEDICINE

## 2019-07-10 PROCEDURE — 99215 OFFICE O/P EST HI 40 MIN: CPT | Performed by: INTERNAL MEDICINE

## 2019-07-10 PROCEDURE — G8419 CALC BMI OUT NRM PARAM NOF/U: HCPCS | Performed by: INTERNAL MEDICINE

## 2019-07-10 RX ORDER — AMOXICILLIN 250 MG
CAPSULE ORAL
COMMUNITY
Start: 2015-04-09 | End: 2019-07-10

## 2019-07-10 ASSESSMENT — PATIENT HEALTH QUESTIONNAIRE - PHQ9
SUM OF ALL RESPONSES TO PHQ QUESTIONS 1-9: 0
SUM OF ALL RESPONSES TO PHQ9 QUESTIONS 1 & 2: 0
SUM OF ALL RESPONSES TO PHQ QUESTIONS 1-9: 0
1. LITTLE INTEREST OR PLEASURE IN DOING THINGS: 0
2. FEELING DOWN, DEPRESSED OR HOPELESS: 0

## 2019-07-10 NOTE — PATIENT INSTRUCTIONS
using night-lights. ? Clearing your home so that walkways are free of anything you might trip on.  ? Letting family and friends know that you have been feeling dizzy. This will help them know how to help you. When should you call for help? Call 911 anytime you think you may need emergency care. For example, call if:    · You passed out (lost consciousness).     · You have dizziness along with symptoms of a heart attack. These may include:  ? Chest pain or pressure, or a strange feeling in the chest.  ? Sweating. ? Shortness of breath. ? Nausea or vomiting. ? Pain, pressure, or a strange feeling in the back, neck, jaw, or upper belly or in one or both shoulders or arms. ? Lightheadedness or sudden weakness. ? A fast or irregular heartbeat.     · You have symptoms of a stroke. These may include:  ? Sudden numbness, tingling, weakness, or loss of movement in your face, arm, or leg, especially on only one side of your body. ? Sudden vision changes. ? Sudden trouble speaking. ? Sudden confusion or trouble understanding simple statements. ? Sudden problems with walking or balance. ? A sudden, severe headache that is different from past headaches.    Call your doctor now or seek immediate medical care if:    · You feel dizzy and have a fever, headache, or ringing in your ears.     · You have new or increased nausea and vomiting.     · Your dizziness does not go away or comes back.    Watch closely for changes in your health, and be sure to contact your doctor if:    · You do not get better as expected. Where can you learn more? Go to https://cirilo.Explorer.io. org and sign in to your Cloudcity account. Enter R589 in the [facility] box to learn more about \"Dizziness: Care Instructions. \"     If you do not have an account, please click on the \"Sign Up Now\" link. Current as of: September 23, 2018  Content Version: 12.0  © 5687-5431 Healthwise, Incorporated.  Care instructions adapted under low-fat or nonfat dairy foods. Limit sodium, alcohol, and sweets. · If your doctor recommends it, get more exercise. Walking is a good choice. Bit by bit, increase the amount you walk every day. Try for at least 30 minutes on most days of the week. You also may want to swim, bike, or do other activities. When should you call for help? Call 911 anytime you think you may need emergency care. For example, call if:    · You have sudden chest pain and shortness of breath, or you cough up blood.     · You passed out (lost consciousness).    Call your doctor now or seek immediate medical care if:    · You have any new chest pain.    Watch closely for changes in your health, and be sure to contact your doctor if:    · You have any problems making your doctor visits.     · You do not get better as expected. Where can you learn more? Go to https://Carrier IQpepiceweb.[employer] Nation. org and sign in to your Zonbo Media account. Enter 21 [phone] in the Qoopl box to learn more about \"Thoracic Aortic Aneurysm: Care Instructions. \"     If you do not have an account, please click on the \"Sign Up Now\" link. Current as of: September 26, 2018  Content Version: 12.0  © 7857-5704 Healthwise, Incorporated. Care instructions adapted under license by [facility] ([facility]). If you have questions about a medical condition or this instruction, always ask your healthcare professional. [place] [address] any warranty or liability for your use of this information.

## 2019-07-10 NOTE — PROGRESS NOTES
without esophagitis  Condition is much improved after Botox injection for treatment of achalasia  Advised patient to communicate with gastroenterology regarding stopping the Protonix    3. Achalasia  As above    4. Benign essential HTN  Condition is slightly higher today here in the office although it was initially normal upon check-in. Continue amlodipine for now  Stay well-hydrated particularly during these hot summer days and avoid dehydration  - ECHO Complete 2D W Doppler W Color; Future  - CBC Auto Differential; Future  - Comprehensive Metabolic Panel; Future  - Lipid Panel; Future  - TSH without Reflex; Future    5. Hyperlipidemia, unspecified hyperlipidemia type  Condition is uncontrolled  Patient refuses medical therapy  Continue aggressive lifestyle modification  - Comprehensive Metabolic Panel; Future  - Lipid Panel; Future    6. Thoracic ascending aortic aneurysm (HCC)  Condition stability is uncertain. He is overdue for his CT scan. Patient has been noncompliant with obtaining the CAT scan as recommended previously in the past.  This time he was not able to get it due to his esophageal problems  Patient is now ready to obtain scan  - ECHO Complete 2D W Doppler W Color; Future    7. Adenomatous polyp of colon, unspecified part of colon  Colonoscopy in 5 years or as advised by gastroenterology    8. History of malignant neoplasm of kidney    - CT ABDOMEN PELVIS W IV CONTRAST Additional Contrast? Radiologist Recommendation; Future    9. History of malignant neoplasm of prostate  Check PSA  - CT ABDOMEN PELVIS W IV CONTRAST Additional Contrast? Radiologist Recommendation; Future  - PSA PROSTATIC SPECIFIC ANTIGEN; Future    10. Abdominal fullness  Etiology is unclear. Needs updated CT scan given his history of renal cell carcinoma  - CT ABDOMEN PELVIS W IV CONTRAST Additional Contrast? Radiologist Recommendation; Future    11.   PVCs/first-degree AV block /abnormal EKG  Echo  4-hour Holter            This

## 2019-09-09 RX ORDER — AMLODIPINE BESYLATE 5 MG/1
TABLET ORAL
Qty: 90 TABLET | Refills: 1 | Status: SHIPPED | OUTPATIENT
Start: 2019-09-09 | End: 2019-12-19

## 2019-09-09 NOTE — TELEPHONE ENCOUNTER
Last appointment: 7/10/2019  Next appointment: None scheduled  Last refill: 02/04/19 # 120 with one refill

## 2019-09-21 ENCOUNTER — OFFICE VISIT (OUTPATIENT)
Dept: INTERNAL MEDICINE CLINIC | Age: 84
End: 2019-09-21
Payer: MEDICARE

## 2019-09-21 VITALS
SYSTOLIC BLOOD PRESSURE: 140 MMHG | HEART RATE: 78 BPM | OXYGEN SATURATION: 98 % | DIASTOLIC BLOOD PRESSURE: 68 MMHG | BODY MASS INDEX: 27.7 KG/M2 | WEIGHT: 209 LBS | HEIGHT: 73 IN

## 2019-09-21 DIAGNOSIS — Z13.6 SCREENING FOR CARDIOVASCULAR CONDITION: ICD-10-CM

## 2019-09-21 DIAGNOSIS — I71.21 THORACIC ASCENDING AORTIC ANEURYSM: ICD-10-CM

## 2019-09-21 DIAGNOSIS — E78.5 HYPERLIPIDEMIA, UNSPECIFIED HYPERLIPIDEMIA TYPE: ICD-10-CM

## 2019-09-21 DIAGNOSIS — H61.23 BILATERAL IMPACTED CERUMEN: ICD-10-CM

## 2019-09-21 DIAGNOSIS — K22.0 ACHALASIA: ICD-10-CM

## 2019-09-21 DIAGNOSIS — Z00.00 ROUTINE GENERAL MEDICAL EXAMINATION AT A HEALTH CARE FACILITY: Primary | ICD-10-CM

## 2019-09-21 PROCEDURE — 1123F ACP DISCUSS/DSCN MKR DOCD: CPT | Performed by: INTERNAL MEDICINE

## 2019-09-21 PROCEDURE — G0446 INTENS BEHAVE THER CARDIO DX: HCPCS | Performed by: INTERNAL MEDICINE

## 2019-09-21 PROCEDURE — 69210 REMOVE IMPACTED EAR WAX UNI: CPT | Performed by: INTERNAL MEDICINE

## 2019-09-21 PROCEDURE — G0439 PPPS, SUBSEQ VISIT: HCPCS | Performed by: INTERNAL MEDICINE

## 2019-09-21 PROCEDURE — 4040F PNEUMOC VAC/ADMIN/RCVD: CPT | Performed by: INTERNAL MEDICINE

## 2019-09-21 ASSESSMENT — LIFESTYLE VARIABLES
AUDIT TOTAL SCORE: 1
HAVE YOU OR SOMEONE ELSE BEEN INJURED AS A RESULT OF YOUR DRINKING: 0
HOW OFTEN DURING THE LAST YEAR HAVE YOU FAILED TO DO WHAT WAS NORMALLY EXPECTED FROM YOU BECAUSE OF DRINKING: 0
HOW OFTEN DO YOU HAVE SIX OR MORE DRINKS ON ONE OCCASION: 0
HAS A RELATIVE, FRIEND, DOCTOR, OR ANOTHER HEALTH PROFESSIONAL EXPRESSED CONCERN ABOUT YOUR DRINKING OR SUGGESTED YOU CUT DOWN: 0
HOW MANY STANDARD DRINKS CONTAINING ALCOHOL DO YOU HAVE ON A TYPICAL DAY: 0
HOW OFTEN DO YOU HAVE A DRINK CONTAINING ALCOHOL: 1
HOW OFTEN DURING THE LAST YEAR HAVE YOU BEEN UNABLE TO REMEMBER WHAT HAPPENED THE NIGHT BEFORE BECAUSE YOU HAD BEEN DRINKING: 0
HOW OFTEN DURING THE LAST YEAR HAVE YOU NEEDED AN ALCOHOLIC DRINK FIRST THING IN THE MORNING TO GET YOURSELF GOING AFTER A NIGHT OF HEAVY DRINKING: 0
HOW OFTEN DURING THE LAST YEAR HAVE YOU HAD A FEELING OF GUILT OR REMORSE AFTER DRINKING: 0
AUDIT-C TOTAL SCORE: 1
HOW OFTEN DURING THE LAST YEAR HAVE YOU FOUND THAT YOU WERE NOT ABLE TO STOP DRINKING ONCE YOU HAD STARTED: 0

## 2019-09-21 ASSESSMENT — PATIENT HEALTH QUESTIONNAIRE - PHQ9
SUM OF ALL RESPONSES TO PHQ QUESTIONS 1-9: 0
SUM OF ALL RESPONSES TO PHQ QUESTIONS 1-9: 0

## 2019-09-21 NOTE — PROGRESS NOTES
[place] [facility] (Dermatology)  Yeny Quick MD as Consulting Physician (Internal Medicine)      Based upon direct observation of the patient, evaluation of cognition reveals recent and remote memory intact. ROS:  CV: Underwent echocardiogram, 24-hour Holter monitor at [facility].  Recent CT scan of the chest showed a stable ascending thoracic aortic aneurysm measuring 4.5 cm. Reviewed Holter and echo. No further lightheadedness. : History of prostate cancer and renal cell cancer. CT scan of chest abdomen pelvis on 7/26/2019 at [facility]. No recurrent malignancy noted. Last PSA was 0.77. Patient under the care of urology  GI:  Dysphagia gone after Botox, Dr. payne      Physical Exam    Vitals:    09/21/19 0904   BP: (!) 140/68   Pulse: 78   SpO2: 98%   Weight: 209 lb (94.8 kg)   Height: 6' 0.5\" (1.842 m)     Body mass index is 27.96 kg/m². Wt Readings from Last 3 Encounters:   09/21/19 209 lb (94.8 kg)   07/10/19 205 lb (93 kg)   11/19/18 219 lb (99.3 kg)     BP Readings from Last 3 Encounters:   09/21/19 (!) 140/68   07/10/19 (!) 150/70   04/03/19 (!) 144/70      GEN:  80 y.o. male who is in NAD, A&O. He appears stated age and well nourished. He is cooperative and pleasant. HEAD:  NC/AT, no lesions. EARS: Bilateral EACs obstructed with impacted cerumen  MOUTH & THROAT:  Oral cavity is clear without mucosal lesions. Tongue is midline. Dentition is in good repair. No pharyngeal erythema or exudate. NECK:  Supple. Full ROM. Trachea is midline. No increased JVD. No thyromegaly or nodules. No masses  LYMPH: No C/SC/A/F nodes  CARDIAC:  S1S2 NL. Regular rhythm. No murmurs  VASC:  Pedal pulses 2/4. Carotid upstrokes 2+. No bruits noted. PULM:  Lungs are CTA. Symmetric breath sounds noted. EXT:  No Cyanosis or clubbing. No edema. SKIN: Warm and dry, normal turgor, no rash or lesions of concern. NEURO: No focal deficits. Moves all extremities.   No ataxia  PSYCH:  Mood and affect NL. Judgement and insight NL. Procedure : Cerumen removed from both ears utilizing a curette. Otoscopy used for magnification. TMs appear to be normal    Patient's complete Health Risk Assessment and screening values have been reviewed and are found in Flowsheets. The following problems were reviewed today and where indicated follow up appointments were made and/or referrals ordered. Positive Risk Factor Screenings with Interventions:     No Positive Risk Factors identified today. Personalized Preventive Plan   Current Health Maintenance Status  Immunization History   Administered Date(s) Administered    Influenza Vaccine, unspecified formulation 09/21/2015, 09/09/2016, 09/10/2018    Influenza Virus Vaccine 09/19/2011, 09/08/2014    Influenza Whole 10/06/2010    Influenza, High Dose (Fluzone 65 yrs and older) 09/10/2019    Pneumococcal Conjugate 13-valent ([id number]) 08/30/2016    Pneumococcal Polysaccharide ([id number]) 10/24/2011    Tdap (Boostrix, Adacel) 11/12/2013    Zoster Live (Zostavax) 10/24/2011    Zoster Recombinant (Shingrix) 08/08/2018, 12/18/2018        Health Maintenance   Topic Date Due    Annual Wellness Visit (AWV)  05/29/2019    Potassium monitoring  07/10/2020    Creatinine monitoring  07/10/2020    DTaP/Tdap/Td vaccine (2 - Td) 11/12/2023    Flu vaccine  Completed    Shingles Vaccine  Completed    Pneumococcal 65+ years Vaccine  Completed     Recommendations for Preventive Services Due: see orders and patient instructions/AVS.  . Recommended screening schedule for the next 5-10 years is provided to the patient in written form: see Patient Mickey Nguyen was seen today for medicare awv. Diagnoses and all orders for this visit:    Routine general medical examination at a health care facility  --All care gaps identified and addressed. Patient is doing a wonderful job at living well.   --All vaccines are up-to-date  --All cancer screenings are up-to-date. --Annual CT scans chest abdomen pelvis  --Faxed his last PSA to Dr. Reyes Kelley. -     SD Intens behave ther cardio dx, 15 minutes []    Thoracic ascending aortic aneurysm (HCC)  --Annual CT scan    Screening for cardiovascular condition  -     SD Intens behave ther cardio dx, 15 minutes []    Bilateral impacted cerumen  --Literature provided on earwax  -     01109 - SD REMOVE IMPACTED EAR WAX    Achalasia  --Follow-up with GI as needed for repeat Botox    Hyperlipidemia, unspecified hyperlipidemia type  --Counseled as below              Cardiovascular Disease Risk Counseling: Assessed the patient's risk to develop cardiovascular disease and reviewed main risk factors. Reviewed steps to reduce disease risk including:   · Quitting tobacco use, reducing amount smoked, or not starting the habit  · Making healthy food choices-- Low fat, fruits, vegetable. Low sweets. · Being physically active and gradualy increasing activity levels -- Golf, walking, cycling  · Reduce weight and determine a healthy BMI goal  · Monitor blood pressure and treat if higher than 140/90 mmHg-- 140/68. Home 128-140/65-75  · Maintain blood total cholesterol levels under 5 mmol/l or 190 mg/dl--232-- INTOLERANT TO STATINS  · Maintain LDL cholesterol levels under 3.0 mmol/l or 115 mg/dl -- 141-- INTOLERANT TO STATINS  · Control blood glucose levels  · Consider taking aspirin (75 mg daily), once blood pressure is controlled   Provided a follow up plan.   Time spent (minutes): 15

## 2019-09-21 NOTE — PATIENT INSTRUCTIONS
Advance Directives: Care Instructions  Your Care Instructions  An advance directive is a legal way to state your wishes at the end of your life. It tells your family and your doctor what to do if you can no longer say what you want. There are two main types of advance directives. You can change them any time that your wishes change. · A living will tells your family and your doctor your wishes about life support and other treatment. · A durable power of  for health care lets you name a person to make treatment decisions for you when you can't speak for yourself. This person is called a health care agent. If you do not have an advance directive, decisions about your medical care may be made by a doctor or a  who doesn't know you. It may help to think of an advance directive as a gift to the people who care for you. If you have one, they won't have to make tough decisions by themselves. Follow-up care is a key part of your treatment and safety. Be sure to make and go to all appointments, and call your doctor if you are having problems. It's also a good idea to know your test results and keep a list of the medicines you take. How can you care for yourself at home? · Discuss your wishes with your loved ones and your doctor. This way, there are no surprises. · Many states have a unique form. Or you might use a universal form that has been approved by many states. This kind of form can sometimes be completed and stored online. Your electronic copy will then be available wherever you have a connection to the Internet. In most cases, doctors will respect your wishes even if you have a form from a different state. · You don't need a  to do an advance directive. But you may want to get legal advice. · Think about these questions when you prepare an advance directive:  ? Who do you want to make decisions about your medical care if you are not able to?  Many people choose a family member or today your provider may have ordered immunizations, labs, imaging, and/or referrals for you. A list of these orders (if applicable) as well as your Preventive Care list are included within your After Visit Summary for your review. Other Preventive Recommendations:    · A preventive eye exam performed by an eye specialist is recommended every 1-2 years to screen for glaucoma; cataracts, macular degeneration, and other eye disorders. · A preventive dental visit is recommended every 6 months. · Try to get at least 150 minutes of exercise per week or 10,000 steps per day on a pedometer . · Order or download the FREE \"Exercise & Physical Activity: Your Everyday Guide\" from [employer]. Call 4-226.101.8720 or search [employer] online. · You need [phone] mg of calcium and 2238-4307 IU of vitamin D per day. It is possible to meet your calcium requirement with diet alone, but a vitamin D supplement is usually necessary to meet this goal.  · When exposed to the sun, use a sunscreen that protects against both UVA and UVB radiation with an SPF of 30 or greater. Reapply every 2 to 3 hours or after sweating, drying off with a towel, or swimming. · Always wear a seat belt when traveling in a car. Always wear a helmet when riding a bicycle or motorcycle. Patient Education        Learning About Achalasia  What is achalasia? Achalasia (say \"ux-ozh-CTE-zhuh\") is a problem with the nerves that control the muscles in the esophagus, the tube that carries food and liquid to your stomach. The muscles may tighten or spasm. The muscle that connects the lower end of the esophagus to the stomach may not open and close the right way. Doctors aren't always sure what causes achalasia. It may be a problem with the nerves in your esophagus. Or it may be something you were born with. What happens when you have achalasia?   When you have achalasia, you may have trouble swallowing foods and drinks. You may spit up (regurgitate) food. That means undigested food may come back up into your mouth. You may also have heartburn or pain in your chest.  These symptoms may cause you to lose weight. There's also a risk that you could inhale food or liquid into your lungs (aspiration). That may lead to pneumonia. How is achalasia treated? Medicines, such as nitroglycerin or calcium channel blockers, can help relax the muscles in your esophagus for a while. You may need a procedure called esophageal dilation. During the procedure, you will get medicines through a needle in a vein (IV) in your arm or hand. These medicines reduce pain and will make you feel relaxed and drowsy. Your throat will also be numbed. You may not remember much about the treatment. The doctor will guide a balloon or a plastic tool (dilator) down your throat and into your esophagus. The dilator is used to widen any narrow area. To guide the dilator, the doctor may use a tool called an endoscope, or scope. It's a thin, flexible, lighted viewing tool. It goes into your mouth and down your throat. Or the doctor may use a thin wire as a guide. Some people have surgery for achalasia. You will have general anesthesia so that you are completely unaware and won't feel pain during the surgery. Surgeries include:  Heller myotomy. In this surgery, the doctor makes small cuts on the muscles inside the esophagus. This allows the opening to the stomach to relax so that food and drinks can pass through. It can be done through a thin tube that is placed in a small cut in the belly. Sometimes it's done through a larger cut (incision). You will stay in the hospital for 1 or 2 days after the surgery. Peroral endoscopic myotomy (POEM). This surgery also involves making small cuts on the muscles. But it's done through a scope that goes in through the mouth. You will stay in the hospital for a day after the surgery.   Achalasia may also be treated test results and keep a list of the medicines you take. How can you care for yourself at home? · Control high blood pressure. High blood pressure increases the chance of an aneurysm bursting. A healthy lifestyle along with medicines may help you lower your blood pressure. · Manage cholesterol to help keep your blood vessels healthy. A healthy lifestyle along with medicines may help you manage cholesterol. · Do not smoke. Smoking can make the aneurysm grow faster. If you need help quitting, talk to your doctor about stop-smoking programs and medicines. These can increase your chances of quitting for good. · Stay at a healthy weight. Being overweight may not make the aneurysm any worse. But being at a healthy weight can reduce your risks from surgery if you need it. · Eat heart-healthy foods. These include fruits, vegetables, whole grains, fish, and low-fat or nonfat dairy foods. Limit sodium, alcohol, and sweets. · If your doctor recommends it, get more exercise. Walking is a good choice. Bit by bit, increase the amount you walk every day. Try for at least 30 minutes on most days of the week. You also may want to swim, bike, or do other activities. When should you call for help? Call 911 anytime you think you may need emergency care. For example, call if:    · You have sudden chest pain and shortness of breath, or you cough up blood.     · You passed out (lost consciousness).    Call your doctor now or seek immediate medical care if:    · You have any new chest pain.    Watch closely for changes in your health, and be sure to contact your doctor if:    · You have any problems making your doctor visits.     · You do not get better as expected. Where can you learn more? Go to https://[employer]ilo[employer]. org and sign in to your [employer] account. Enter 21 [phone] in the [employer] box to learn more about \"Thoracic Aortic Aneurysm: Care Instructions. \"     If you do not have an account, please information. Patient Education        Well Visit, Over 72: Care Instructions  Your Care Instructions    Physical exams can help you stay healthy. Your doctor has checked your overall health and may have suggested ways to take good care of yourself. He or she also may have recommended tests. At home, you can help prevent illness with healthy eating, regular exercise, and other steps. Follow-up care is a key part of your treatment and safety. Be sure to make and go to all appointments, and call your doctor if you are having problems. It's also a good idea to know your test results and keep a list of the medicines you take. How can you care for yourself at home? · Reach and stay at a healthy weight. This will lower your risk for many problems, such as obesity, diabetes, heart disease, and high blood pressure. · Get at least 30 minutes of exercise on most days of the week. Walking is a good choice. You also may want to do other activities, such as running, swimming, cycling, or playing tennis or team sports. · Do not smoke. Smoking can make health problems worse. If you need help quitting, talk to your doctor about stop-smoking programs and medicines. These can increase your chances of quitting for good. · Protect your skin from too much sun. When you're outdoors from 10 a.m. to 4 p.m., stay in the shade or cover up with clothing and a hat with a wide brim. Wear sunglasses that block UV rays. Even when it's cloudy, put broad-spectrum sunscreen (SPF 30 or higher) on any exposed skin. · See a dentist one or two times a year for checkups and to have your teeth cleaned. · Wear a seat belt in the car. · Limit alcohol to 2 drinks a day for men and 1 drink a day for women. Too much alcohol can cause health problems. Follow your doctor's advice about when to have certain tests. These tests can spot problems early. For men and women  · Cholesterol.  Your doctor will tell you how often to have this done based on medicines you take. How can you care for yourself at home? Taking care of yourself  · You may get dizzy if you do not drink enough water. To prevent dehydration, drink plenty of fluids, enough so that your urine is light yellow or clear like water. Choose water and other caffeine-free clear liquids. If you have kidney, heart, or liver disease and have to limit fluids, talk with your doctor before you increase the amount of fluids you drink. · Exercise regularly to improve your strength, muscle tone, and balance. Walk if you can. Swimming may be a good choice if you cannot walk easily. · Have your vision and hearing checked each year or any time you notice a change. If you have trouble seeing and hearing, you might not be able to avoid objects and could lose your balance. · Know the side effects of the medicines you take. Ask your doctor or pharmacist whether the medicines you take can affect your balance. Sleeping pills or sedatives can affect your balance. · Limit the amount of alcohol you drink. Alcohol can impair your balance and other senses. · Ask your doctor whether calluses or corns on your feet need to be removed. If you wear loose-fitting shoes because of calluses or corns, you can lose your balance and fall. · Talk to your doctor if you have numbness in your feet. Preventing falls at home  · Remove raised doorway thresholds, throw rugs, and clutter. Repair loose carpet or raised areas in the floor. · Move furniture and electrical cords to keep them out of walking paths. · Use nonskid floor wax, and wipe up spills right away, especially on ceramic tile floors. · If you use a walker or cane, put rubber tips on it. If you use crutches, clean the bottoms of them regularly with an abrasive pad, such as steel wool. · Keep your house well lit, especially [place], and outside walkways. Use night-lights in areas such as hallways and bathrooms.  Add extra light switches or use remote switches

## 2019-12-03 RX ORDER — AMLODIPINE BESYLATE 5 MG/1
TABLET ORAL
Qty: 120 TABLET | OUTPATIENT
Start: 2019-12-03

## 2019-12-19 RX ORDER — AMLODIPINE BESYLATE 5 MG/1
TABLET ORAL
Qty: 90 TABLET | Refills: 3 | Status: SHIPPED | OUTPATIENT
Start: 2019-12-19 | End: 2020-10-27

## 2020-01-22 ENCOUNTER — OFFICE VISIT (OUTPATIENT)
Dept: INTERNAL MEDICINE CLINIC | Age: 85
End: 2020-01-22
Payer: MEDICARE

## 2020-01-22 VITALS
DIASTOLIC BLOOD PRESSURE: 60 MMHG | WEIGHT: 210 LBS | HEART RATE: 62 BPM | BODY MASS INDEX: 28.44 KG/M2 | RESPIRATION RATE: 12 BRPM | SYSTOLIC BLOOD PRESSURE: 138 MMHG | HEIGHT: 72 IN

## 2020-01-22 PROCEDURE — G8427 DOCREV CUR MEDS BY ELIG CLIN: HCPCS | Performed by: INTERNAL MEDICINE

## 2020-01-22 PROCEDURE — G8417 CALC BMI ABV UP PARAM F/U: HCPCS | Performed by: INTERNAL MEDICINE

## 2020-01-22 PROCEDURE — G8482 FLU IMMUNIZE ORDER/ADMIN: HCPCS | Performed by: INTERNAL MEDICINE

## 2020-01-22 PROCEDURE — 1036F TOBACCO NON-USER: CPT | Performed by: INTERNAL MEDICINE

## 2020-01-22 PROCEDURE — 1123F ACP DISCUSS/DSCN MKR DOCD: CPT | Performed by: INTERNAL MEDICINE

## 2020-01-22 PROCEDURE — 99214 OFFICE O/P EST MOD 30 MIN: CPT | Performed by: INTERNAL MEDICINE

## 2020-01-22 PROCEDURE — 4040F PNEUMOC VAC/ADMIN/RCVD: CPT | Performed by: INTERNAL MEDICINE

## 2020-01-22 NOTE — PATIENT INSTRUCTIONS
Avoid NSAID's (Motrin, Aleve, Advil, Ibuprofen),  vitamin supplements and fish oil 1 week prior to procedure

## 2020-01-22 NOTE — PROGRESS NOTES
Sleep disturbance     Thoracic aneurysm     Found to be NL on CT scan 2012. No prior H/O DVT, PE or bleeding dyscrasias    Past Surgical History:   Procedure Laterality Date    BACK SURGERY  2000    L5-S1 Laminectomy?, Dr. Carlota Woodson  5/2013    with vitrectomy, [facility]    CATARACT REMOVAL WITH IMPLANT Left 09/08/2016    COLONOSCOPY  9/24/2012    Dr. Agatha Lorenzo      Vitrectomy, membrane peel    KNEE SURGERY      MOHS SURGERY  1011/2013, 10/18/2018    Dr. Xiang Hannah  2009    Robotic assisted Cryotherapy    PROSTATE SURGERY  4/1997    Robotic assisted Prostatectomy    TOTAL KNEE ARTHROPLASTY Left 5/18/2015       No reported problems with anesthesia. Medications/Allergies     Current Outpatient Medications   Medication Sig Dispense Refill    amLODIPine (NORVASC) 5 MG tablet TAKE 1 TABLET BY MOUTH  DAILY 90 tablet 3    docusate sodium (COLACE) 100 MG capsule Take 2 capsules by mouth daily      aspirin 81 MG EC tablet Take 81 mg by mouth daily. No current facility-administered medications for this visit. Allergies   Allergen Reactions    Meperidine Nausea And Vomiting and Other (See Comments)     Other reaction(s): Hypotension  Heart rate dropped      Lisinopril Other (See Comments)    Ace Inhibitors     Morphine      Low blood pressure         Substance Use History     Social History     Tobacco Use    Smoking status: Never Smoker    Smokeless tobacco: Never Used   Substance Use Topics    Alcohol use: Yes    Drug use: Not on file          Family History     Family History   Problem Relation Age of Onset    Cancer Mother         Leukemia    Stroke Father         No family history of problems with general anesthesia, venous thrombosis, or bleeding dyscrasias.       REVIEW OF SYSTEMS:    CONSTITUTIONAL:  Neg   Recent weight changes, fatigue, fever, chills or night sweats, anorexia, Sleep difficulties  EYES: Neg Blurry vision, loss of vision, double vision, tearing, eye pain. EARS:  Neg Hearing loss, tinnitus, vertigo, discharge or otalgia. NOSE:  Neg  Rhinorrhea, sneezing, itching, allergy, epistaxis, or snoring  MOUTH/THROAT:  Neg Bleeding gums, hoarseness, sore throat, dysphagia, throat infections, or dentures  RESPIRATORY:  Neg SOB ,wheeze, cough, sputum, hemoptysis  CARDIOVASCULAR:  Neg Chest pain, palpitations, heart murmur, dyspnea on exertion, orthopnea, paroxysmal nocturnal dyspnea or edema of extremities, or claudication. GASTROINTESTINAL:  Neg   Nausea, vomiting, or diarrhea, constipation hematemesis, heart burn, dysphagia,change in bowel movements or stool caliber, hematochezia, melena, abdominal pain  GENITOURINARY:  Neg  Urinary frequency, hesitancy, urgency, polyuria, dysuria, hematuria. H/O Renal cell and prostate cancer. HEMATOLOGIC/LYMPHATIC:  Neg  Anemia, bleeding dyscrasias, easy bruising, blood clots (DVT/PE), transfusions, or enlarged lymph nodes  NEUROLOGICAL:  Neg  Loss of Consciousness, memeory loss or forgetfulness, confusion, difficulty concentrating, seizures, insomina, aphasia or dysarthria unilateral weakness or paresthesias, ataxia, headaches, syncope, tremor, or H/O head trauma. PSYCHIATRIC:  Neg  Depression, anxiety  SKIN :  Neg  Rash  ENDOCRINE:   No H/O Diabetes or Thyroid disease. Physical Exam    /60   Pulse 62   Resp 12   Ht 6' (1.829 m)   Wt 210 lb (95.3 kg)   BMI 28.48 kg/m²   GEN:  80 y.o. male who is in NAD, A&O. He appears stated age and well nourished. He is cooperative and pleasant. HEAD:  NC/AT, no lesions. EYES:  JUWAN, EOMI, No scleral icterus or conjunctival injection or discharge. Visual fields in tact to confrontation. EARS:  EAC's clear, TM's normal.   NOSE:  Nasal cavity is clear. No mucosal congestion or discharge. Sinuses are nontender. MOUTH & THROAT:  Oral cavity is clear without mucosal lesions. Tongue is midline.   Dentition is in good repair. No pharyngeal erythema or exudate. NECK:  Supple. Full ROM. Trachea is midline. No increased JVD. No thyromegaly or nodules. No masses  LYMPH: No C/SC nodes  CARDIAC:  S1S2 NL. Regular rhythm. No murmur. No ectopy. PMI is non-displaced. VASC:  Pedal pulses 2/4. Carotid upstrokes 2+. No bruits noted. PULM:  Lungs are CTA. Symmetric breath sounds noted. AP Diameter NL. GI:  Abdomen is soft and nontender. No distension. No organomegaly. No masses. No pulsatile masses. + Rectus diastasis. + Umbilical hernia. EXT:  No Cyanosis or clubbing. No edema. SKIN: Warm and dry, normal turgor, no rash or lesions of concern. NEURO:  Cranial nerves 2-12 are NL.   PSYCH:  Mood and affect NL. Judgement and insight NL. Encounter Diagnoses   Name Primary?  Preop exam for internal medicine Yes    Epiretinal membrane (ERM) of left eye     Vitreous detachment, left     Achalasia--stable     Benign essential HTN--well controlled for this 80-year-old male     Thoracic ascending aortic aneurysm (HCC)--stable, annual CT scan. Plan:  Acceptable risk for the planned procedure.   No contraindications at this time    EKG-- Not needed  Lab--Not needed                Electronically Signed:  Carlotta Cheatham D.O      Copy of H&P given to pt and sent to [facility]

## 2020-04-29 ENCOUNTER — TELEPHONE (OUTPATIENT)
Dept: INTERNAL MEDICINE CLINIC | Age: 85
End: 2020-04-29

## 2020-04-29 NOTE — TELEPHONE ENCOUNTER
Patient states his order for Lumbar injection has .    Please fax a new order to:  [facility]  fax: [phone]

## 2020-05-14 ENCOUNTER — TELEPHONE (OUTPATIENT)
Dept: INTERNAL MEDICINE CLINIC | Age: 85
End: 2020-05-14

## 2020-05-15 ENCOUNTER — TELEMEDICINE (OUTPATIENT)
Dept: INTERNAL MEDICINE CLINIC | Age: 85
End: 2020-05-15
Payer: MEDICARE

## 2020-05-15 VITALS
TEMPERATURE: 98.3 F | WEIGHT: 208 LBS | DIASTOLIC BLOOD PRESSURE: 81 MMHG | BODY MASS INDEX: 28.21 KG/M2 | SYSTOLIC BLOOD PRESSURE: 142 MMHG | HEART RATE: 68 BPM

## 2020-05-15 PROCEDURE — 99213 OFFICE O/P EST LOW 20 MIN: CPT | Performed by: INTERNAL MEDICINE

## 2020-05-15 PROCEDURE — 4040F PNEUMOC VAC/ADMIN/RCVD: CPT | Performed by: INTERNAL MEDICINE

## 2020-05-15 PROCEDURE — G8428 CUR MEDS NOT DOCUMENT: HCPCS | Performed by: INTERNAL MEDICINE

## 2020-05-15 PROCEDURE — 1123F ACP DISCUSS/DSCN MKR DOCD: CPT | Performed by: INTERNAL MEDICINE

## 2020-05-15 NOTE — TELEPHONE ENCOUNTER
This absolutely needs a visit! If they think an epidural caused an infection then he needs to deal with them!

## 2020-05-15 NOTE — PROGRESS NOTES
Last 3 Encounters:   05/15/20 208 lb (94.3 kg)   01/22/20 210 lb (95.3 kg)   09/21/19 209 lb (94.8 kg)     BP Readings from Last 3 Encounters:   05/15/20 (!) 142/81   01/22/20 138/60   09/21/19 (!) 140/68           Physical Exam  Constitutional:       Appearance: Normal appearance. Neck:      Musculoskeletal: Normal range of motion. Pulmonary:      Effort: Pulmonary effort is normal. No respiratory distress. Musculoskeletal:      Comments: Antalgic gait   Skin:     Findings: No rash. Neurological:      Mental Status: He is alert. Comments: Moves all extremities. Psychiatric:         Mood and Affect: Mood normal.         Thought Content: Thought content normal.           Other pertinent observable physical exam findings-     Due to this being a TeleHealth encounter, evaluation of the following organ systems is limited: Vitals/Constitutional/EENT/Resp/CV/GI//MS/Neuro/Skin/Heme-Lymph-Imm. ASSESSMENT/PLAN:  1. Trochanteric bursitis of right hip  Findings are suggestive of bursitis given the location of the pain and the fact that is exacerbated with direct pressure over the greater trochanteric region. Trial of Aleve twice daily for the next 5 to 7 days with food  Patient counseled on the potential adverse side effects of NSAIDs  Home exercises provided  If no improvement consider a course of oral steroids  If symptoms continue, may need repeat evaluation of the lumbar spine to ensure that the lateral hip pain does not represent ongoing radiculopathy and may also have to have imaging of the hip. 2. Lumbosacral radiculopathy at L3  3. Lumbar foraminal stenosis  4. Lumbar facet arthropathy  5. DDD (degenerative disc disease), lumbar  Condition is improved since his epidural.  Procedure note from Dr. Anjelica Menezes reviewed in the electronic health record. No follow-ups on file. An  electronic signature was used to authenticate this note.     --Mikhail Gallardo,  on 5/15/2020 at 5:10

## 2020-05-15 NOTE — PATIENT INSTRUCTIONS
the hip joint as much as you can without pain every day. As the pain gets better, keep doing range-of-motion exercises. Ask your doctor for exercises that will make the muscles around the hip joint stronger. Do these as directed. · You can slowly return to the activity that caused the pain, but do it with less effort until you can do it without pain or swelling. Be sure to warm up before and stretch after you do the activity. When should you call for help? Call your doctor now or seek immediate medical care if:    · You have a fever.     · You have increased swelling or redness in your hip.     · You cannot use your hip, or the pain in your hip gets worse.    Watch closely for changes in your health, and be sure to contact your doctor if:    · You have pain for 2 weeks or longer despite home treatment. Where can you learn more? Go to https://Wandoujiapepaulinoeb.Thing5. org and sign in to your Pop Up Archive account. Enter S810 in the Averail box to learn more about \"Hip Bursitis: Care Instructions. \"     If you do not have an account, please click on the \"Sign Up Now\" link. Current as of: June 26, 2019Content Version: 12.4  © 5045-1864 Healthwise, Incorporated. Care instructions adapted under license by [facility] ([facility]). If you have questions about a medical condition or this instruction, always ask your healthcare professional. [place] [address] any warranty or liability for your use of this information. Patient Education        Hip Bursitis: Exercises  Introduction  Here are some examples of exercises for you to try. The exercises may be suggested for a condition or for rehabilitation. Start each exercise slowly. Ease off the exercises if you start to have pain. You will be told when to start these exercises and which ones will work best for you. How to do the exercises  Hip rotator stretch   1. Lie on your back with both knees bent and your feet flat on the floor.   2. Put you learn more? Go to https://chpepiceweb.healthAhometo. org and sign in to your Pretio Interactive account. Enter P882 in the [employer] box to learn more about \"Hip Bursitis: Exercises. \"     If you do not have an account, please click on the \"Sign Up Now\" link. Current as of: June 26, 2019Content Version: 12.4  © 5607-9513 Clicko. Care instructions adapted under license by [facility] ([facility]). If you have questions about a medical condition or this instruction, always ask your healthcare professional. [place] [address] any warranty or liability for your use of this information. Patient Education        Low Back Arthritis: Exercises  Introduction  Here are some examples of typical rehabilitation exercises for your condition. Start each exercise slowly. Ease off the exercise if you start to have pain. Your doctor or physical therapist will tell you when you can start these exercises and which ones will work best for you. When you are not being active, find a comfortable position for rest. Some people are comfortable on the floor or a medium-firm bed with a small pillow under their head and another under their knees. Some people prefer to lie on their side with a pillow between their knees. Don't stay in one position for too long. Take short walks (10 to 20 minutes) every 2 to 3 hours. Avoid slopes, hills, and stairs until you feel better. Walk only distances you can manage without pain, especially leg pain. How to do the exercises  Pelvic tilt   1. Lie on your back with your knees bent. 2. \"Brace\" your stomach--tighten your muscles by pulling in and imagining your belly button moving toward your spine. 3. Press your lower back into the floor. You should feel your hips and pelvis rock back. 4. Hold for 6 seconds while breathing smoothly. 5. Relax and allow your pelvis and hips to rock forward. 6. Repeat 8 to 12 times. Back stretches   1.  Get down on your hands until your knees are slightly bent, pressing your lower back into the wall. 4. Hold for about 6 seconds, then slide back up the wall. 5. Repeat 8 to 12 times. Follow-up care is a key part of your treatment and safety. Be sure to make and go to all appointments, and call your doctor if you are having problems. It's also a good idea to know your test results and keep a list of the medicines you take. Where can you learn more? Go to https://Symetrica.Firetide. org and sign in to your MediCard account. Enter O643 in the Lab4U box to learn more about \"Low Back Pain: Exercises. \"     If you do not have an account, please click on the \"Sign Up Now\" link. Current as of: June 26, 2019Content Version: 12.4  © 3781-7319 Healthwise, Incorporated. Care instructions adapted under license by [facility] ([facility]). If you have questions about a medical condition or this instruction, always ask your healthcare professional. [address] any warranty or liability for your use of this information.

## 2020-07-14 ENCOUNTER — TELEPHONE (OUTPATIENT)
Dept: INTERNAL MEDICINE CLINIC | Age: 85
End: 2020-07-14

## 2020-07-14 NOTE — TELEPHONE ENCOUNTER
Called after hours by his wife. Urinating blood with clots started this afternoon. Painful. Recommended ER - they will go to [facility].

## 2020-08-27 ENCOUNTER — TELEPHONE (OUTPATIENT)
Dept: INTERNAL MEDICINE CLINIC | Age: 85
End: 2020-08-27

## 2020-08-27 NOTE — TELEPHONE ENCOUNTER
Pt had barium swallow this morning. Unable to complete due to pt unable to swallow. Saw dr payne last month and had EDG Procedure with botox injection. Pt has not been able to eat or drink since. Pt called Dr Juan Manuel Dejesus office and pt stated \"dr payne thinks I 'm crazy\"   Pt requesting dr Anne Ugalde opinion and a second opinion. Pt requesting to discuss with  dr Ernie Morley or come in to office if necessary.

## 2020-08-27 NOTE — TELEPHONE ENCOUNTER
Happy to see him in the office. If he is not able to eat or drink anything, he needs to go to ER. Is food getting stuck?

## 2020-08-28 ENCOUNTER — VIRTUAL VISIT (OUTPATIENT)
Dept: INTERNAL MEDICINE CLINIC | Age: 85
End: 2020-08-28
Payer: MEDICARE

## 2020-08-28 PROCEDURE — 99443 PR PHYS/QHP TELEPHONE EVALUATION 21-30 MIN: CPT | Performed by: INTERNAL MEDICINE

## 2020-08-28 RX ORDER — PANTOPRAZOLE SODIUM 40 MG/1
40 GRANULE, DELAYED RELEASE ORAL
COMMUNITY
End: 2020-12-04 | Stop reason: ALTCHOICE

## 2020-08-28 NOTE — PATIENT INSTRUCTIONS
myotomy. In this surgery, the doctor makes small cuts on the muscles inside the esophagus. This allows the opening to the stomach to relax so that food and drinks can pass through. It can be done through a thin tube that is placed in a small cut in the belly. Sometimes it's done through a larger cut (incision). You will stay in the hospital for 1 or 2 days after the surgery. Peroral endoscopic myotomy (POEM). This surgery also involves making small cuts on the muscles. But it's done through a scope that goes in through the mouth. You will stay in the hospital for a day after the surgery. Achalasia may also be treated with a botulinum toxin A (Botox) injection. Botox is injected into the muscle near the opening to the stomach through a scope. It helps the muscle relax. You may need another shot of Botox after 6 months or a year. Follow-up care is a key part of your treatment and safety. Be sure to make and go to all appointments, and call your doctor if you are having problems. It's also a good idea to know your test results and keep a list of the medicines you take. Where can you learn more? Go to https://CytomX Therapeutics.[email or web address] org and sign in to your [employer] account. Enter A002 in the MiRTLE Medical box to learn more about \"Learning About Achalasia. \"     If you do not have an account, please click on the \"Sign Up Now\" link. Current as of: August 12, 2019               Content Version: 12.5  © 6349-2145 Healthwise, Incorporated. Care instructions adapted under license by [facility] ([facility]). If you have questions about a medical condition or this instruction, always ask your healthcare professional. [address] any warranty or liability for your use of this information.

## 2020-08-28 NOTE — PROGRESS NOTES
2020    [facility]) Physicians  Internal Medicine  Patient Encounter  Jermaine Wall D.O., [address] -- Audio/Visual (During [facility] public health emergency)      I discussed at this telephone/video visit is a nontraditional type of visit that we are conducting in lieu of an office visit to minimize patient risk during the coronavirus pandemic. I discussed with the patient that I would not be able to perform a full physical examination, but that in most other respects the visit would be beneficial to his/her continued medical care. He/She again gave verbal consent for us to conduct this type of visit. Chief Complaint   Patient presents with    Dysphagia         HPI:    Rosalinda Yin (:  1935) has requested an audio/video evaluation for the following concern(s):Difficulty swallowing    80 y.o. male being evaluated via virtual video visit due to the coronavirus pandemic emergency and public health crisis and inability to see the patient face-to-face in the office. Pt is being seen today with C/O recurrent issues with swallowing difficulty. He has a known diagnosis of Achalasia. He has had Botox treatments in the past.  He last had an EGD and Botox injections on 2020. That afternoon, he was not able to swallow at all-- he could not eat or drink anything. He started Protonix over the last three weeks. He had a Botox Tx 2019 which worked well. He called the GI office on 2020 (1 month later) and reported he was not swallowing well. He wa seen back in the office on 2020. Pt reports difficulty swallowing solids and liquids. He was sent for a barium swallow with plans to repeat EGD with balloon dilation. The According to GI note, he could be developing fibrosis due to  Botox injection. Due to technical difficulties the video visit was transitioned to a phone consultation.     Barium Swallow 2020--   IMPRESSION:              Dilated esophagus without evidence of peristalsis. There is stasis of contrast and only minimal     passage into the stomach after several minutes of waiting. This may be related to the    patient's reported Botox injections but also be seen with achalasia. Endoscopy may be useful for further evaluation. He is trying to ride a bike 8 miles a day. He got lightheaded yesterday. He has not been able to drink much. He can take in a couple of sips. He feels OK now. On 7/14/2020 he went to the ER with dysuria, frequency. He was diagnosed with a complicated UTI and hemorrhagic cystitis. UA showed Lg blood, mod leukocytes, >100 RBC, >100 WBC. 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 Prior to Visit Medications    Medication Sig Taking? Authorizing Provider   pantoprazole sodium (PROTONIX) 40 MG PACK packet Take 40 mg by mouth every morning (before breakfast) Yes Historical Provider, MD   amLODIPine (NORVASC) 5 MG tablet TAKE 1 TABLET BY MOUTH  DAILY Yes Mikhail Gallardo DO   docusate sodium (COLACE) 100 MG capsule Take 2 capsules by mouth daily Yes Mikhail Gallardo DO   aspirin 81 MG EC tablet Take 81 mg by mouth daily.  Yes Historical Provider, MD         Review of Systems  As per HPI      PHYSICAL EXAMINATION:    Vital Signs: (As obtained by patient/caregiver or practitioner observation)    Patient-Reported Vitals 8/28/2020   Patient-Reported Weight 202lb   Patient-Reported Systolic 147   Patient-Reported Diastolic 78   Patient-Reported Pulse 83   Patient-Reported Temperature 97.6          Wt Readings from Last 3 Encounters:   05/15/20 208 lb (94.3 kg)   01/22/20 210 lb (95.3 kg)   09/21/19 209 lb (94.8 kg)     BP Readings from Last 3 Encounters:   05/15/20 (!) 142/81   01/22/20 138/60   09/21/19 (!) 140/68           Physical Exam  Not able to be performed    Other pertinent observable physical exam findings-     Due to this being a TeleHealth encounter, evaluation of the following organ systems is limited: Vitals/Constitutional/EENT/Resp/CV/GI//MS/Neuro/Skin/Heme-Lymph-Imm. ASSESSMENT/PLAN:  1. Achalasia  Patient is requesting a second opinion. He is interested in the  GI group  The referral has been placed. I have spoken with their office and they will try to expedite an appointment. - External Referral To Gastroenterology  - CBC Auto Differential; Future  - Basic Metabolic Panel; Future  - URINALYSIS WITH MICROSCOPIC; Future    2. Dehydration  Patient counseled on the need to first and foremost stay as hydrated as possible. Advised patient not to exercise as aggressively as he has been doing in order to avoid dehydration.  - CBC Auto Differential; Future  - Basic Metabolic Panel; Future  - URINALYSIS WITH MICROSCOPIC; Future    3. History of UTI    - URINALYSIS WITH MICROSCOPIC; Future    4. History of prostate cancer    - URINALYSIS WITH MICROSCOPIC; Future    5. History of kidney cancer    - URINALYSIS WITH MICROSCOPIC; Future    Time--30 minutes      No follow-ups on file. An  electronic signature was used to authenticate this note.     --Ygnacio Boxer, DO on 8/28/2020 at 4:47 PM    T119}    Pursuant to the emergency declaration under the [facility], 1135 waiver authority and the [employer] and [employer] Act, this Virtual  Visit was conducted, with patient's consent, to reduce the patient's risk of exposure to COVID-19 and provide continuity of care for an

## 2020-08-31 ENCOUNTER — TELEPHONE (OUTPATIENT)
Dept: INTERNAL MEDICINE CLINIC | Age: 85
End: 2020-08-31

## 2020-08-31 DIAGNOSIS — K22.0 ACHALASIA: ICD-10-CM

## 2020-08-31 DIAGNOSIS — Z87.440 HISTORY OF UTI: ICD-10-CM

## 2020-08-31 DIAGNOSIS — E86.0 DEHYDRATION: ICD-10-CM

## 2020-08-31 DIAGNOSIS — Z85.46 HISTORY OF PROSTATE CANCER: ICD-10-CM

## 2020-08-31 DIAGNOSIS — Z85.528 HISTORY OF KIDNEY CANCER: ICD-10-CM

## 2020-08-31 LAB
ANION GAP SERPL CALCULATED.3IONS-SCNC: 10 MMOL/L (ref 3–16)
BASOPHILS ABSOLUTE: 0 K/UL (ref 0–0.2)
BASOPHILS RELATIVE PERCENT: 0.6 %
BILIRUBIN URINE: NEGATIVE
BLOOD, URINE: NEGATIVE
BUN BLDV-MCNC: 12 MG/DL (ref 7–20)
CALCIUM SERPL-MCNC: 9.1 MG/DL (ref 8.3–10.6)
CHLORIDE BLD-SCNC: 100 MMOL/L (ref 99–110)
CLARITY: CLEAR
CO2: 26 MMOL/L (ref 21–32)
COLOR: YELLOW
CREAT SERPL-MCNC: 1 MG/DL (ref 0.8–1.3)
EOSINOPHILS ABSOLUTE: 0.2 K/UL (ref 0–0.6)
EOSINOPHILS RELATIVE PERCENT: 2.5 %
EPITHELIAL CELLS, UA: 0 /HPF (ref 0–5)
GFR AFRICAN AMERICAN: >60
GFR NON-AFRICAN AMERICAN: >60
GLUCOSE BLD-MCNC: 96 MG/DL (ref 70–99)
GLUCOSE URINE: NEGATIVE MG/DL
HCT VFR BLD CALC: 40.4 % (ref 40.5–52.5)
HEMOGLOBIN: 13.6 G/DL (ref 13.5–17.5)
HYALINE CASTS: 0 /LPF (ref 0–8)
KETONES, URINE: NEGATIVE MG/DL
LEUKOCYTE ESTERASE, URINE: NEGATIVE
LYMPHOCYTES ABSOLUTE: 2.5 K/UL (ref 1–5.1)
LYMPHOCYTES RELATIVE PERCENT: 38.4 %
MCH RBC QN AUTO: 31.5 PG (ref 26–34)
MCHC RBC AUTO-ENTMCNC: 33.6 G/DL (ref 31–36)
MCV RBC AUTO: 93.7 FL (ref 80–100)
MICROSCOPIC EXAMINATION: NORMAL
MONOCYTES ABSOLUTE: 0.5 K/UL (ref 0–1.3)
MONOCYTES RELATIVE PERCENT: 8 %
NEUTROPHILS ABSOLUTE: 3.3 K/UL (ref 1.7–7.7)
NEUTROPHILS RELATIVE PERCENT: 50.5 %
NITRITE, URINE: NEGATIVE
PDW BLD-RTO: 13.1 % (ref 12.4–15.4)
PH UA: 7 (ref 5–8)
PLATELET # BLD: 251 K/UL (ref 135–450)
PMV BLD AUTO: 9.4 FL (ref 5–10.5)
POTASSIUM SERPL-SCNC: 4.1 MMOL/L (ref 3.5–5.1)
PROTEIN UA: NEGATIVE MG/DL
RBC # BLD: 4.31 M/UL (ref 4.2–5.9)
RBC UA: 2 /HPF (ref 0–4)
SODIUM BLD-SCNC: 136 MMOL/L (ref 136–145)
SPECIFIC GRAVITY UA: 1.01 (ref 1–1.03)
URINE TYPE: NORMAL
UROBILINOGEN, URINE: 0.2 E.U./DL
WBC # BLD: 6.5 K/UL (ref 4–11)
WBC UA: 0 /HPF (ref 0–5)

## 2020-08-31 NOTE — TELEPHONE ENCOUNTER
Spoke with the GI specialist from , Dr. Arie Schneider. He has already heard from their office and has an appointment tomorrow afternoon. Patient was very thankful for our assistance.

## 2020-10-27 RX ORDER — AMLODIPINE BESYLATE 5 MG/1
TABLET ORAL
Qty: 120 TABLET | Refills: 2 | Status: SHIPPED | OUTPATIENT
Start: 2020-10-27 | End: 2021-09-23

## 2020-11-30 ENCOUNTER — TELEPHONE (OUTPATIENT)
Dept: INTERNAL MEDICINE CLINIC | Age: 85
End: 2020-11-30

## 2020-11-30 NOTE — TELEPHONE ENCOUNTER
----- Message from Maryam Sharp sent at 11/27/2020  9:31 AM EST -----  Subject: Message to Provider    QUESTIONS  Information for Provider? Pt would like a complete physical. Offered an   appointment for 12/23 but because it was VV he said he would prefer to   come in the office because he wants to do bloodwork and urine test.  ---------------------------------------------------------------------------  --------------  CALL BACK INFO  What is the best way for the office to contact you? OK to leave message on   voicemail  Preferred Call Back Phone Number? [phone]  ---------------------------------------------------------------------------  --------------  SCRIPT ANSWERS  Relationship to Patient?  Self

## 2020-12-04 ENCOUNTER — VIRTUAL VISIT (OUTPATIENT)
Dept: INTERNAL MEDICINE CLINIC | Age: 85
End: 2020-12-04
Payer: MEDICARE

## 2020-12-04 DIAGNOSIS — N39.42 URINARY INCONTINENCE WITHOUT SENSORY AWARENESS: ICD-10-CM

## 2020-12-04 DIAGNOSIS — R35.0 URINARY FREQUENCY: ICD-10-CM

## 2020-12-04 LAB
ANION GAP SERPL CALCULATED.3IONS-SCNC: 10 MMOL/L (ref 3–16)
BASOPHILS ABSOLUTE: 0 K/UL (ref 0–0.2)
BASOPHILS RELATIVE PERCENT: 0.5 %
BILIRUBIN URINE: NEGATIVE
BLOOD, URINE: NEGATIVE
BUN BLDV-MCNC: 17 MG/DL (ref 7–20)
CALCIUM SERPL-MCNC: 9.4 MG/DL (ref 8.3–10.6)
CHLORIDE BLD-SCNC: 96 MMOL/L (ref 99–110)
CLARITY: CLEAR
CO2: 28 MMOL/L (ref 21–32)
COLOR: YELLOW
CREAT SERPL-MCNC: 0.8 MG/DL (ref 0.8–1.3)
EOSINOPHILS ABSOLUTE: 0.2 K/UL (ref 0–0.6)
EOSINOPHILS RELATIVE PERCENT: 2.1 %
EPITHELIAL CELLS, UA: 0 /HPF (ref 0–5)
GFR AFRICAN AMERICAN: >60
GFR NON-AFRICAN AMERICAN: >60
GLUCOSE BLD-MCNC: 111 MG/DL (ref 70–99)
GLUCOSE URINE: NEGATIVE MG/DL
HCT VFR BLD CALC: 40.7 % (ref 40.5–52.5)
HEMOGLOBIN: 14.1 G/DL (ref 13.5–17.5)
HYALINE CASTS: 0 /LPF (ref 0–8)
KETONES, URINE: NEGATIVE MG/DL
LEUKOCYTE ESTERASE, URINE: NEGATIVE
LYMPHOCYTES ABSOLUTE: 3 K/UL (ref 1–5.1)
LYMPHOCYTES RELATIVE PERCENT: 35.2 %
MCH RBC QN AUTO: 31.7 PG (ref 26–34)
MCHC RBC AUTO-ENTMCNC: 34.7 G/DL (ref 31–36)
MCV RBC AUTO: 91.5 FL (ref 80–100)
MICROSCOPIC EXAMINATION: NORMAL
MONOCYTES ABSOLUTE: 0.6 K/UL (ref 0–1.3)
MONOCYTES RELATIVE PERCENT: 7.5 %
NEUTROPHILS ABSOLUTE: 4.7 K/UL (ref 1.7–7.7)
NEUTROPHILS RELATIVE PERCENT: 54.7 %
NITRITE, URINE: NEGATIVE
PDW BLD-RTO: 13 % (ref 12.4–15.4)
PH UA: 7 (ref 5–8)
PLATELET # BLD: 255 K/UL (ref 135–450)
PMV BLD AUTO: 9.7 FL (ref 5–10.5)
POTASSIUM SERPL-SCNC: 4 MMOL/L (ref 3.5–5.1)
PROTEIN UA: NEGATIVE MG/DL
RBC # BLD: 4.45 M/UL (ref 4.2–5.9)
RBC UA: 2 /HPF (ref 0–4)
SODIUM BLD-SCNC: 134 MMOL/L (ref 136–145)
SPECIFIC GRAVITY UA: 1.01 (ref 1–1.03)
URINE TYPE: NORMAL
UROBILINOGEN, URINE: 1 E.U./DL
WBC # BLD: 8.6 K/UL (ref 4–11)
WBC UA: 0 /HPF (ref 0–5)

## 2020-12-04 PROCEDURE — 1123F ACP DISCUSS/DSCN MKR DOCD: CPT | Performed by: INTERNAL MEDICINE

## 2020-12-04 PROCEDURE — 99213 OFFICE O/P EST LOW 20 MIN: CPT | Performed by: INTERNAL MEDICINE

## 2020-12-04 PROCEDURE — 4040F PNEUMOC VAC/ADMIN/RCVD: CPT | Performed by: INTERNAL MEDICINE

## 2020-12-04 PROCEDURE — G8427 DOCREV CUR MEDS BY ELIG CLIN: HCPCS | Performed by: INTERNAL MEDICINE

## 2020-12-04 RX ORDER — FAMOTIDINE 20 MG/1
20 TABLET, FILM COATED ORAL DAILY
COMMUNITY
End: 2022-01-15 | Stop reason: DRUGHIGH

## 2020-12-04 RX ORDER — POLYETHYLENE GLYCOL 3350 17 G/17G
17 POWDER, FOR SOLUTION ORAL DAILY
Qty: 1530 G | Refills: 1 | Status: SHIPPED | OUTPATIENT
Start: 2020-12-04

## 2020-12-04 NOTE — PROGRESS NOTES
2020    [facility]) Physicians  Internal Medicine  Patient Encounter  Jason Means D.O., [address] -- Audio/Visual (During [facility]-89 public health emergency)      I discussed at this telephone/video visit is a nontraditional type of visit that we are conducting in lieu of an office visit to minimize patient risk during the coronavirus pandemic. I discussed with the patient that I would not be able to perform a full physical examination, but that in most other respects the visit would be beneficial to his/her continued medical care. He/She again gave verbal consent for us to conduct this type of visit. Chief Complaint   Patient presents with    Urinary Frequency         HPI:    Shelton Corcoran (:  1935) has requested an audio/video evaluation for the following concern(s):Urinary Frequency, Constipation. 80 y.o. male being evaluated via virtual video visit due to the coronavirus pandemic emergency and public health crisis and inability to see the patient face-to-face in the office. Pt seen today with C.O constipation. This has been a problem for several years but has been more of a problem in recent months. The patient has tried Colace but this has not been effective. He usually finds every for 5 days he has to use a Dulcolax suppository or tablet. He then tried MiraLAX. He finds it if he uses this every day he does have a bowel movement every day. He denies any abdominal pain, nausea or vomiting. Patient is doing well with his achalasia. He is on Pepcid 20 mg daily. He is status post esophageal dilation by Dr. Erika Ernandez. He did not have another Botox treatment. The dilation was on 2020. Patient is also complaining of urinary frequency. He denies any dysuria or hematuria. Patient is status post robotic assisted prostatectomy for prostate cancer. He is under the care of Dr. Denise Denney. He was last seen in 2020.   Patient reports urinary incontinence. He also states he has periods where he is just unaware that he has to urinate. Patient has full accidents. He also reports leakage with sneezing. He describes a sensation of incomplete bladder emptying. Past Medical History:   Diagnosis Date    Achalasia 04/2019    Dr. Tulio Naranjo cell cancer     nose, face    Cataract     Colon polyps     [facility] ([facility] [address]     Gastroesophageal reflux disease without esophagitis 11/19/2018    Hiatal hernia     seen on CT    Hyperlipidemia     Hypertension     Lumbar disc disease     Lumbar spinal stenosis     Lumbar spondylosis     Prostate cancer ([facility] [address] 4/1997    Renal cancer ([facility] [address] 11/2009    Sleep disturbance     Thoracic aneurysm     Found to be NL on CT scan 2012. Prior to Visit Medications    Medication Sig Taking? Authorizing Provider   famotidine (PEPCID) 20 MG tablet Take 20 mg by mouth daily Yes Historical Provider, MD   amLODIPine (NORVASC) 5 MG tablet TAKE 1 TABLET BY MOUTH  DAILY Yes Mikhail Gallardo DO   docusate sodium (COLACE) 100 MG capsule Take 2 capsules by mouth daily Yes Mikhail Gallardo DO   aspirin 81 MG EC tablet Take 81 mg by mouth daily. Yes Historical Provider, MD         Review of Systems  As per HPI      PHYSICAL EXAMINATION:    Vital Signs: (As obtained by patient/caregiver or practitioner observation)    There were no vitals filed for this visit. There is no height or weight on file to calculate BMI. Wt Readings from Last 3 Encounters:   05/15/20 208 lb (94.3 kg)   01/22/20 210 lb (95.3 kg)   09/21/19 209 lb (94.8 kg)     BP Readings from Last 3 Encounters:   05/15/20 (!) 142/81   01/22/20 138/60   09/21/19 (!) 140/68           Physical Exam  Constitutional:       General: He is not in acute distress. Appearance: Normal appearance. He is not ill-appearing. HENT:      Mouth/Throat:      Mouth: Mucous membranes are moist.   Eyes:      General: No scleral icterus.      Extraocular Movements: Extraocular movements intact. Conjunctiva/sclera: Conjunctivae normal.   Neck:      Musculoskeletal: Normal range of motion. Pulmonary:      Effort: Pulmonary effort is normal. No respiratory distress. Neurological:      Mental Status: He is alert. Psychiatric:         Mood and Affect: Mood normal.         Thought Content: Thought content normal.         Judgment: Judgment normal.           Other pertinent observable physical exam findings-     Due to this being a TeleHealth encounter, evaluation of the following organ systems is limited: Vitals/Constitutional/EENT/Resp/CV/GI//MS/Neuro/Skin/Heme-Lymph-Imm. ASSESSMENT/PLAN:  1. Constipation, unspecified constipation type  Problem is uncontrolled  Start MiraLAX 17 g daily. The patient was under the impression he was not able to use this for more than 7 days. Patient advised to monitor for diarrhea. Consider newer novel medications if MiraLAX is unsuccessful  - polyethylene glycol (GLYCOLAX) 17 GM/SCOOP powder; Take 17 g by mouth daily  Dispense: 1530 g; Refill: 1    2. Urinary frequency  Make sure he does not have a urinary tract infection  Consider Myrbetriq  - URINALYSIS WITH MICROSCOPIC; Future  - Culture, Urine; Future  - Basic Metabolic Panel; Future  - CBC Auto Differential; Future    3. Urinary incontinence without sensory awareness  As above  - URINALYSIS WITH MICROSCOPIC; Future  - Culture, Urine; Future  - Basic Metabolic Panel; Future  - CBC Auto Differential; Future    4. Achalasia  Condition is well controlled      Return if symptoms worsen or fail to improve. An  electronic signature was used to authenticate this note.     --Cristino Phillips DO on 12/4/2020 at 3:19 PM    119}    Pursuant to the emergency declaration under the [facility] waiver authority and the [employer] and [employer] Act, this Virtual  Visit was conducted, with patient's consent, to reduce the patient's risk of exposure to COVID-19 and provide continuity of care for an established patient. Services were provided through a video synchronous discussion virtually to substitute for in-person clinic visit.

## 2020-12-06 LAB — URINE CULTURE, ROUTINE: NORMAL

## 2020-12-09 ENCOUNTER — PATIENT MESSAGE (OUTPATIENT)
Dept: INTERNAL MEDICINE CLINIC | Age: 85
End: 2020-12-09

## 2021-04-12 ENCOUNTER — OFFICE VISIT (OUTPATIENT)
Dept: INTERNAL MEDICINE CLINIC | Age: 86
End: 2021-04-12
Payer: MEDICARE

## 2021-04-12 VITALS
BODY MASS INDEX: 27.5 KG/M2 | WEIGHT: 203 LBS | SYSTOLIC BLOOD PRESSURE: 120 MMHG | RESPIRATION RATE: 12 BRPM | TEMPERATURE: 97.8 F | HEART RATE: 87 BPM | HEIGHT: 72 IN | DIASTOLIC BLOOD PRESSURE: 66 MMHG

## 2021-04-12 DIAGNOSIS — K22.0 ACHALASIA: ICD-10-CM

## 2021-04-12 DIAGNOSIS — I71.21 THORACIC ASCENDING AORTIC ANEURYSM: ICD-10-CM

## 2021-04-12 DIAGNOSIS — Z01.818 PREOP EXAM FOR INTERNAL MEDICINE: Primary | ICD-10-CM

## 2021-04-12 DIAGNOSIS — I44.0 FIRST DEGREE AV BLOCK: ICD-10-CM

## 2021-04-12 DIAGNOSIS — N39.3 STRESS INCONTINENCE OF URINE: ICD-10-CM

## 2021-04-12 DIAGNOSIS — I10 BENIGN ESSENTIAL HTN: ICD-10-CM

## 2021-04-12 PROCEDURE — 1123F ACP DISCUSS/DSCN MKR DOCD: CPT | Performed by: INTERNAL MEDICINE

## 2021-04-12 PROCEDURE — 99214 OFFICE O/P EST MOD 30 MIN: CPT | Performed by: INTERNAL MEDICINE

## 2021-04-12 PROCEDURE — G8417 CALC BMI ABV UP PARAM F/U: HCPCS | Performed by: INTERNAL MEDICINE

## 2021-04-12 PROCEDURE — G8427 DOCREV CUR MEDS BY ELIG CLIN: HCPCS | Performed by: INTERNAL MEDICINE

## 2021-04-12 PROCEDURE — 1036F TOBACCO NON-USER: CPT | Performed by: INTERNAL MEDICINE

## 2021-04-12 PROCEDURE — 4040F PNEUMOC VAC/ADMIN/RCVD: CPT | Performed by: INTERNAL MEDICINE

## 2021-04-12 PROCEDURE — 93000 ELECTROCARDIOGRAM COMPLETE: CPT | Performed by: INTERNAL MEDICINE

## 2021-04-12 ASSESSMENT — PATIENT HEALTH QUESTIONNAIRE - PHQ9
SUM OF ALL RESPONSES TO PHQ9 QUESTIONS 1 & 2: 0
SUM OF ALL RESPONSES TO PHQ QUESTIONS 1-9: 0
1. LITTLE INTEREST OR PLEASURE IN DOING THINGS: 0

## 2021-04-12 NOTE — PROGRESS NOTES
[facility]) Physicians  Internal Medicine  Patient Encounter  Jevon Anderson D.O., [facility]          Chief Complaint   Patient presents with    Pre-op Exam       HPI-- 80 y.o. male presents today for a preoperative physical.  Patient is diagnosed with urinary stress incontinence. Pt reports worsening problems with urinary leakage particular with activities that he enjoys such as bike riding. He also has leakage through the night as well. Coughing and sneezing with cause urinary leakage. Patient is now scheduled for a flexible cystoscopy with male bladder sling insertion. I have been asked to see patient for pre-operative risk assessment and clearance. Surgery scheduled for 4/20/2021 by Dr. Rudolph Boyle at the Urology [address] [place] [address]. General anesthesia to be utilized    Patient will have a Covid test at [facility] on 4/14/2021. He had blood and urine study on 4/7/2021. Results requested. Achalasia--patient has had Botox injections in the past.  He switched gastroenterologist and is now under the care of Dr. Andreas Turner. He was last seen on 1/7/2021. He underwent an EGD with pneumatic dilation on 9/11/2020. Patient did very well following that procedure. He has continued to do well. He is on famotidine. He denies any increasing dysphagia or regurgitation. Hypertension--Patient remains on amlodipine 5 mg daily. He tolerates the medication. He denies any swelling. He denies any lightheadedness, headache, chest pain, shortness of breath. Thoracic ascending aortic aneurysm--last CT scan in July 2020 measured the aneurysm at 4.5 cm. He is asymptomatic.       Medical/Surgical Histories     Past Medical History:   Diagnosis Date    Achalasia 04/2019    Dr. Clemente Smith cell cancer     nose, face    Cataract     Colon polyps     Dilated aortic root ([address]     First degree AV block 4/12/2021    Gastroesophageal reflux disease without esophagitis 11/19/2018    Hiatal hernia seen on CT    Hyperlipidemia     Hypertension     Lumbar disc disease     Lumbar spinal stenosis     Lumbar spondylosis     Prostate [facility] ([facility] 4/1997    Renal cancer ([facility] 11/2009    Sleep disturbance     Thoracic aneurysm     Found to be NL on CT scan 2012. No prior H/O DVT, PE or bleeding dyscrasias    Past Surgical History:   Procedure Laterality Date    BACK SURGERY  2000    L5-S1 Laminectomy?, Dr. Symone Nugent  5/2013    with vitrectomy, [facility]    CATARACT REMOVAL WITH IMPLANT Left 09/08/2016    COLONOSCOPY  9/24/2012    Dr. Kenya Israel, membrane peel    KNEE SURGERY      MOHS SURGERY  1011/2013, 10/18/2018    Dr. Teresita Lima  2009    Robotic assisted Cryotherapy    PROSTATE SURGERY  4/1997    Robotic assisted Prostatectomy    TOTAL KNEE ARTHROPLASTY Left 5/18/2015    UPPER GASTROINTESTINAL ENDOSCOPY  09/11/2020    With esophageal dilation, Dr. Josh Galvan       No reported problems with anesthesia. Medications/Allergies     Current Outpatient Medications   Medication Sig Dispense Refill    famotidine (PEPCID) 20 MG tablet Take 20 mg by mouth daily      polyethylene glycol (GLYCOLAX) 17 GM/SCOOP powder Take 17 g by mouth daily 1530 g 1    amLODIPine (NORVASC) 5 MG tablet TAKE 1 TABLET BY MOUTH  DAILY 120 tablet 2    aspirin 81 MG EC tablet Take 81 mg by mouth daily. No current facility-administered medications for this visit. Allergies   Allergen Reactions    Meperidine Nausea And Vomiting and Other (See Comments)     Other reaction(s): Hypotension  Heart rate dropped      Lisinopril Other (See Comments)    Ace Inhibitors     Morphine      Low blood pressure         Substance Use History     Social History     Tobacco Use    Smoking status: Never Smoker    Smokeless tobacco: Never Used   Substance Use Topics    Alcohol use:  Yes    Drug use: Not on file          Family History     Family History   Problem Relation Age of Onset    Cancer Mother         Leukemia    Stroke Father         No family history of problems with general anesthesia, venous thrombosis, or bleeding dyscrasias. REVIEW OF SYSTEMS:    CONSTITUTIONAL:  Neg   Recent weight changes, fatigue, fever, chills or night sweats, anorexia, Sleep difficulties  EYES: Neg  Blurry vision, loss of vision, double vision, tearing, eye pain. EARS:  Neg Hearing loss, tinnitus, vertigo, discharge or otalgia. NOSE:  Neg  Rhinorrhea, sneezing, itching, allergy, epistaxis, or snoring  MOUTH/THROAT:  Neg Bleeding gums, hoarseness, sore throat, dysphagia, throat infections, or dentures  RESPIRATORY:  Neg SOB ,wheeze, cough, sputum, hemoptysis  CARDIOVASCULAR:  Neg Chest pain, palpitations, heart murmur, dyspnea on exertion, orthopnea, paroxysmal nocturnal dyspnea or edema of extremities, or claudication. H/O Ascending thoracic aneurysm. 4.5 cm 7/2019. GASTROINTESTINAL:  Neg   Nausea, vomiting, or diarrhea, constipation hematemesis, heart burn, change in bowel movements or stool caliber, hematochezia, melena, abdominal pain. History of achalasia. Last balloon dilation was in September 2020. He has been doing well since then. GENITOURINARY:  Neg  Urinary frequency, hesitancy, urgency, polyuria, dysuria, hematuria. H/O Renal cell and prostate cancer. Last PSA in March was 0.72 which was a little lower. HEMATOLOGIC/LYMPHATIC:  Neg  Anemia, bleeding dyscrasias, easy bruising, blood clots (DVT/PE), transfusions, or enlarged lymph nodes  NEUROLOGICAL:  Neg  Loss of Consciousness, memeory loss or forgetfulness, confusion, difficulty concentrating, seizures, insomina, aphasia or dysarthria unilateral weakness or paresthesias, ataxia, headaches, syncope, tremor, or H/O head trauma. PSYCHIATRIC:  Neg  Depression, anxiety  SKIN :  Neg  Rash  ENDOCRINE:   No H/O Diabetes or Thyroid disease.        Physical Exam    /66

## 2021-04-12 NOTE — PATIENT INSTRUCTIONS
Conjuntivae and eyelids appear normal, Sclerae : White without injection Stop Aspirin 1 week prior to surgery    Avoid NSAID's (Motrin, Aleve, Advil, Ibuprofen),  vitamin supplements and fish oil 1 week prior to procedure

## 2021-09-22 NOTE — TELEPHONE ENCOUNTER
Last appointment: 4/12/2021  Next appointment: Visit date not found  Last refill: 10/27/20  no return date given at last OV

## 2021-09-23 RX ORDER — AMLODIPINE BESYLATE 5 MG/1
TABLET ORAL
Qty: 120 TABLET | Refills: 2 | Status: SHIPPED | OUTPATIENT
Start: 2021-09-23 | End: 2022-10-17

## 2022-01-15 ENCOUNTER — OFFICE VISIT (OUTPATIENT)
Dept: INTERNAL MEDICINE CLINIC | Age: 87
End: 2022-01-15
Payer: MEDICARE

## 2022-01-15 VITALS
HEART RATE: 70 BPM | BODY MASS INDEX: 28.04 KG/M2 | WEIGHT: 207 LBS | DIASTOLIC BLOOD PRESSURE: 88 MMHG | HEIGHT: 72 IN | OXYGEN SATURATION: 98 % | SYSTOLIC BLOOD PRESSURE: 138 MMHG

## 2022-01-15 DIAGNOSIS — K22.0 ACHALASIA: ICD-10-CM

## 2022-01-15 DIAGNOSIS — R73.01 IFG (IMPAIRED FASTING GLUCOSE): ICD-10-CM

## 2022-01-15 DIAGNOSIS — Z13.220 SCREENING FOR HYPERLIPIDEMIA: ICD-10-CM

## 2022-01-15 DIAGNOSIS — I10 BENIGN ESSENTIAL HTN: ICD-10-CM

## 2022-01-15 DIAGNOSIS — Z13.6 SCREENING FOR CARDIOVASCULAR CONDITION: ICD-10-CM

## 2022-01-15 DIAGNOSIS — Z00.00 ROUTINE GENERAL MEDICAL EXAMINATION AT A HEALTH CARE FACILITY: Primary | ICD-10-CM

## 2022-01-15 DIAGNOSIS — I71.21 THORACIC ASCENDING AORTIC ANEURYSM: ICD-10-CM

## 2022-01-15 DIAGNOSIS — Z13.1 SCREENING FOR DIABETES MELLITUS: ICD-10-CM

## 2022-01-15 DIAGNOSIS — G25.81 RESTLESS LEGS: ICD-10-CM

## 2022-01-15 DIAGNOSIS — M51.37 DDD (DEGENERATIVE DISC DISEASE), LUMBOSACRAL: ICD-10-CM

## 2022-01-15 PROCEDURE — 1123F ACP DISCUSS/DSCN MKR DOCD: CPT | Performed by: INTERNAL MEDICINE

## 2022-01-15 PROCEDURE — G8482 FLU IMMUNIZE ORDER/ADMIN: HCPCS | Performed by: INTERNAL MEDICINE

## 2022-01-15 PROCEDURE — 4040F PNEUMOC VAC/ADMIN/RCVD: CPT | Performed by: INTERNAL MEDICINE

## 2022-01-15 PROCEDURE — G0446 INTENS BEHAVE THER CARDIO DX: HCPCS | Performed by: INTERNAL MEDICINE

## 2022-01-15 PROCEDURE — G0439 PPPS, SUBSEQ VISIT: HCPCS | Performed by: INTERNAL MEDICINE

## 2022-01-15 RX ORDER — FAMOTIDINE 20 MG/1
10 TABLET, FILM COATED ORAL 2 TIMES DAILY
Qty: 60 TABLET | Status: SHIPPED | COMMUNITY
Start: 2022-01-15

## 2022-01-15 ASSESSMENT — PATIENT HEALTH QUESTIONNAIRE - PHQ9
1. LITTLE INTEREST OR PLEASURE IN DOING THINGS: 0
SUM OF ALL RESPONSES TO PHQ9 QUESTIONS 1 & 2: 0
2. FEELING DOWN, DEPRESSED OR HOPELESS: 0
SUM OF ALL RESPONSES TO PHQ QUESTIONS 1-9: 0

## 2022-01-15 ASSESSMENT — LIFESTYLE VARIABLES: HOW OFTEN DO YOU HAVE A DRINK CONTAINING ALCOHOL: 0

## 2022-01-15 NOTE — PROGRESS NOTES
[facility]) Physicians  Internal Medicine  Patient Encounter  Jesse Rain D.O., [place] [employer] Annual Wellness Visit  Name: Charlie Santana Date: 1/15/2022   MRN: [id number] Sex: Male   Age: 80 y.o. Ethnicity: Non- / Non    : 1935 Race: White (non-)      Renetta Roa is here for Medicare AWV    Screenings for behavioral, psychosocial and functional/safety risks, and cognitive dysfunction are all negative except as indicated below. These results, as well as other patient data from the [employer] [address] form, are documented in Flowsheets linked to this Encounter. Medical/Surgical Histories     Past Medical History:   Diagnosis Date    Achalasia 2019    Dr. Leo Goode cell cancer     nose, face    Cataract     Colon polyps     Dilated aortic root ([address]     First degree AV block 2021    Gastroesophageal reflux disease without esophagitis 2018    Hiatal hernia     seen on CT    Hyperlipidemia     Hypertension     Lumbar disc disease     Lumbar spinal stenosis     Lumbar spondylosis     Prostate cancer ([address] 1997    Renal cancer ([address] 2009    Sleep disturbance     Thoracic aneurysm     Found to be NL on CT scan .             Past Surgical History:   Procedure Laterality Date    BACK SURGERY      L5-S1 Laminectomy?, Dr. Liat Barragan  2013    with vitrectomy, [facility]    CATARACT REMOVAL WITH IMPLANT Left 2016    COLONOSCOPY  2012    Dr. Lucila Alvarez, membrane peel    KNEE SURGERY      MOHS SURGERY  , 10/18/2018    Dr. Dasia Maya      Robotic assisted Cryotherapy    PROSTATE SURGERY  1997    Robotic assisted Prostatectomy    TOTAL KNEE ARTHROPLASTY Left 2015    UPPER GASTROINTESTINAL ENDOSCOPY  2020    With esophageal dilation, Dr. Ashley Dawkins  2021    Urethral sling Medications/Allergies     Current Outpatient Medications   Medication Sig Dispense Refill    famotidine (PEPCID) 20 MG tablet Take 0.5 tablets by mouth 2 times daily 60 tablet     amLODIPine (NORVASC) 5 MG tablet TAKE 1 TABLET BY MOUTH  DAILY 120 tablet 2    polyethylene glycol (GLYCOLAX) 17 GM/SCOOP powder Take 17 g by mouth daily 1530 g 1    aspirin 81 MG EC tablet Take 81 mg by mouth daily. (Patient not taking: Reported on 1/15/2022)       No current facility-administered medications for this visit. Allergies   Allergen Reactions    Meperidine Nausea And Vomiting and Other (See Comments)     Other reaction(s): Hypotension  Heart rate dropped      Lisinopril Other (See Comments)    Ace Inhibitors     Morphine      Low blood pressure         Substance Use History     Social History     Tobacco Use    Smoking status: Never Smoker    Smokeless tobacco: Never Used   Substance Use Topics    Alcohol use: Yes    Drug use: Not on file          Family History     Family History   Problem Relation Age of Onset    Cancer Mother         Leukemia    Stroke Father              CareTeam (Including outside providers/suppliers regularly involved in providing care):   Patient Care Team:  Brooke Kearns DO as PCP - General (Internal Medicine)  Brooke Kearns DO as PCP - [address] [place] [address] [facility]  Marly De Leon MD as Consulting Physician (Urology)  Yasmin Matias (Dermatology)  Sumaya Olivares MD as Consulting Physician (Internal Medicine)  Andrea Bautista MD as Consulting Physician (Gastroenterology)  Brandon Sanderson (Gastroenterology)      Based upon direct observation of the patient, evaluation of cognition reveals recent and remote memory intact. ROS:  : Last urology appointment May 2021. History of prostate cancer. Status post robotic assisted prostatectomy. His last PSA was 3/15/2021.  315 20210 0.72. PSA in March. Has incontinence.   Apparently he had a procedure 4/20/2021-- Flexible Cysto with male sleeve insertion. GI: He is under the care of Dr. Farshad Sen for the achalasia. Last EGD with dilation 9/11/2020. He was last seen 8/3/2021. He is on Pepcid 20 mg 1/2 BID. He denies dysphagia at this time. NEURO:  Urge to move legs all the time. Worse at rest and at bed time. He may have a creepy crawly sensation in the legs/feet. MS:  Dr. Jennifer Brownlee did several spinal injections for low back pain. Physical Exam    Vitals:    01/15/22 0819   BP: 138/88   Pulse: 70   SpO2: 98%   Weight: 207 lb (93.9 kg)   Height: 6' (1.829 m)     Body mass index is 28.07 kg/m². Wt Readings from Last 3 Encounters:   01/15/22 207 lb (93.9 kg)   04/12/21 203 lb (92.1 kg)   05/15/20 208 lb (94.3 kg)     BP Readings from Last 3 Encounters:   01/15/22 138/88   04/12/21 120/66   05/15/20 (!) 142/81      GEN:  80 y.o. male who is in NAD, A&O. He appears stated age and well nourished. He is cooperative and pleasant. HEAD:  NC/AT, no lesions. EYES:  JUWAN, EOMI, No scleral icterus or conjunctival injection or discharge. Visual fields in tact to confrontation. NECK:  Supple. Full ROM. Trachea is midline. No increased JVD. No thyromegaly or nodules. No masses  LYMPH: No C/SC/A/F nodes  CARDIAC:  S1S2 NL. Regular rhythm. No murmur/clicks/rubs. No ectopy. PMI is non-displaced. VASC:  Pedal pulses 2/4. Carotid upstrokes 2+. No bruits noted. PULM:  Lungs are CTA. Symmetric breath sounds noted. AP Diameter NL. GI:  Abdomen is soft and nontender. No distension. No organomegaly. No masses. No pulsatile masses. EXT:  No Cyanosis or clubbing. No edema. SKIN: Warm and dry, normal turgor, no rash or lesions of concern. NEURO:  No focal deficits. MS:  No C/T/L paraspinal tenderness. No scoliosis. No joint effusions. Full joint ROM. PSYCH:  Mood and affect NL. Judgement and insight NL.      Patient's complete Health Risk Assessment and screening values have been reviewed and are found in [facility]. The following problems were reviewed today and where indicated follow up appointments were made and/or referrals ordered. Positive Risk Factor Screenings with Interventions:              General Health and ACP:  General  In general, how would you say your health is?: Excellent  In the past 7 days, have you experienced any of the following?  New or Increased Pain, New or Increased Fatigue, Loneliness, Social Isolation, Stress or Anger?: None of These  Do you get the social and emotional support that you need?: Yes  Do you have a Living Will?: Yes  Advance Directives     Power of  Living Will ACP-Advance Directive ACP-Power of     Not on File Not on File Not on File Not on File      General Health Risk Interventions:  · No Living Will: ACP documents already completed- patient asked to provide copy to the office     Hearing/Vision:  No exam data present  Hearing/Vision  Do you or your family notice any trouble with your hearing that hasn't been managed with hearing aids?: (!) Yes  Do you have difficulty driving, watching TV, or doing any of your daily activities because of your eyesight?: (!) Yes  Have you had an eye exam within the past year?: Yes  Hearing/Vision Interventions:  · Hearing concerns:  Recommend hearing evaluation  · Vision concerns:  patient encouraged to make appointment with his/her eye specialist        Personalized Preventive Plan   Current Health Maintenance Status  Immunization History   Administered Date(s) Administered    HEVER-Frandy, [place] Peter, PF, 30mcg/0.3mL 01/19/2021, 02/09/2021, 09/27/2021    Influenza Vaccine, unspecified formulation 09/21/2015, 09/09/2016, 09/10/2018    Influenza Virus Vaccine 09/19/2011, 09/08/2014    Influenza Whole 10/06/2010    Influenza, High Dose (Fluzone 65 yrs and older) 09/10/2019, 09/25/2020, 09/27/2021    Pneumococcal Conjugate 13-valent ([id number]) 08/30/2016    Pneumococcal Polysaccharide ([id number]) 10/24/2011    Tdap (Boostrix, Adacel) 11/12/2013    Zoster Live (Zostavax) 10/24/2011    Zoster Recombinant (Shingrix) 08/08/2018, 12/18/2018        Health Maintenance   Topic Date Due    PSA counseling  07/10/2020    Annual Wellness Visit (AWV)  09/21/2020    Potassium monitoring  12/04/2021    Creatinine monitoring  12/04/2021    Depression Screen  04/12/2022    DTaP/Tdap/Td vaccine (2 - Td or Tdap) 11/12/2023    Flu vaccine  Completed    Shingles Vaccine  Completed    Pneumococcal 65+ years Vaccine  Completed    COVID-19 Vaccine  Completed    Hepatitis A vaccine  Aged Out    Hepatitis B vaccine  Aged Out    Hib vaccine  Aged Out    Meningococcal (ACWY) vaccine  Aged Out     Recommendations for [employer] Due: see orders and patient instructions/AVS.  . Recommended screening schedule for the next 5-10 years is provided to the patient in written form: see Patient Nirali Theodore was seen today for medicare awv. Diagnoses and all orders for this visit:    Routine general medical examination at a health care facility  --All care gaps identified and addressed  --All vaccines are up-to-date  --Due for PSA in March  -     OH Intens behave ther cardio dx, 15 minutes []  -     CBC Auto Differential; Future  -     Comprehensive Metabolic Panel; Future  -     Lipid Panel; Future  -     TSH without Reflex; Future  -     Hemoglobin A1C; Future    Thoracic ascending aortic aneurysm (HCC)  -     OH Intens behave ther cardio dx, 15 minutes []  -     Patient declines any further evaluation or surveillance of the aneurysm. He does not want to get the CAT scan. Patient understands the risks of potential enlarging of the aneurysm and possible rupture which could result in death.     Screening for cardiovascular condition  -     OH Intens behave ther cardio dx, 15 minutes []    Benign essential HTN  -     CBC Auto Differential; Future  -     Comprehensive Metabolic Panel; Future  -     Lipid Panel; Future  -     TSH without Reflex; Future    IFG (impaired fasting glucose)  -     Comprehensive Metabolic Panel; Future  -     Hemoglobin A1C; Future    DDD (degenerative disc disease), lumbosacral    Achalasia  -- Under the care of GI. No recurrent esophageal dysphagia at this time    Screening for diabetes mellitus  -     Comprehensive Metabolic Panel; Future  -     Hemoglobin A1C; Future    Screening for hyperlipidemia  -     Lipid Panel; Future    Restless legs  -- Patient declines medication at this time. -- Literature provided               Cardiovascular Disease Risk Counseling: Assessed the patient's risk to develop cardiovascular disease and reviewed main risk factors. Reviewed steps to reduce disease risk including:   · Quitting tobacco use, reducing amount smoked, or not starting the habit  · Making healthy food choices  · Being physically active and gradualy increasing activity levels   · Reduce weight and determine a healthy BMI goal  · Monitor blood pressure and treat if higher than 140/90 mmHg  · Maintain blood total cholesterol levels under 5 mmol/l or 190 mg/dl  · Maintain LDL cholesterol levels under 3.0 mmol/l or 115 mg/dl   · Control blood glucose levels  · Consider taking aspirin (75 mg daily), once blood pressure is controlled   Provided a follow up plan.   Time spent (minutes): 15 minutes

## 2022-01-15 NOTE — PATIENT INSTRUCTIONS
Advance Directives: Care Instructions  Overview  An advance directive is a legal way to state your wishes at the end of your life. It tells your family and your doctor what to do if you can't say what you want. There are two main types of advance directives. You can change them any time your wishes change. Living will. This form tells your family and your doctor your wishes about life support and other treatment. The form is also called a declaration. Medical power of . This form lets you name a person to make treatment decisions for you when you can't speak for yourself. This person is called a health care agent (health care proxy, health care surrogate). The form is also called a durable power of  for health care. If you do not have an advance directive, decisions about your medical care may be made by a family member, or by a doctor or a  who doesn't know you. It may help to think of an advance directive as a gift to the people who care for you. If you have one, they won't have to make tough decisions by themselves. Follow-up care is a key part of your treatment and safety. Be sure to make and go to all appointments, and call your doctor if you are having problems. It's also a good idea to know your test results and keep a list of the medicines you take. What should you include in an advance directive? Many states have a unique advance directive form. (It may ask you to address specific issues.) Or you might use a universal form that's approved by many states. If your form doesn't tell you what to address, it may be hard to know what to include in your advance directive. Use the questions below to help you get started. · Who do you want to make decisions about your medical care if you are not able to? · What life-support measures do you want if you have a serious illness that gets worse over time or can't be cured? · What are you most afraid of that might happen?  (Maybe you're afraid of having pain, losing your independence, or being kept alive by machines.)  · Where would you prefer to die? (Your home? A hospital? A nursing home?)  · Do you want to donate your organs when you die? · Do you want certain Scientology practices performed before you die? When should you call for help? Be sure to contact your doctor if you have any questions. Where can you learn more? Go to https://[email or web address] org and sign in to your [employer] account. Enter R264 in the [employer] box to learn more about \"Advance Directives: Care Instructions. \"     If you do not have an account, please click on the \"Sign Up Now\" link. Current as of: March 17, 2021               Content Version: 13.1  © 5390-7512 Healthwise, [employer]. Care instructions adapted under license by [facility] ([facility]). If you have questions about a medical condition or this instruction, always ask your healthcare professional. [place] [address] any warranty or liability for your use of this information. Learning About [facility]  What is a living will? A living will, also called a declaration, is a legal form. It tells your family and your doctor your wishes when you can't speak for yourself. It's used by the health professionals who will treat you as you near the end of your life or if you get seriously hurt or ill. If you put your wishes in writing, your loved ones and others will know what kind of care you want. They won't need to guess. This can ease your mind and be helpful to others. And you can change or cancel your living will at any time. A living will is not the same as an estate or property will. An estate will explains what you want to happen with your money and property after you die. How do you use it? A living will is used to describe the kinds of treatment or life support you want as you near the end of your life or if you get seriously hurt or ill.  Keep these facts in mind about living damon. · Your living will is used only if you can't speak or make decisions for yourself. Most often, one or more doctors must certify that you can't speak or decide for yourself before your living will takes effect. · If you get better and can speak for yourself again, you can accept or refuse any treatment. It doesn't matter what you said in your living will. · Some states may limit your right to refuse treatment in certain cases. For example, you may need to clearly state in your living will that you don't want artificial hydration and nutrition, such as being fed through a tube. Is a living will a legal document? A living will is a legal document. Each state has its own laws about living damon. And a living will may be called something else in your state. Here are some things to know about living damon. · You don't need an  to complete a living will. But legal advice can be helpful if your state's laws are unclear. It can also help if your health history is complicated or your family can't agree on what should be in your living will. · You can change your living will at any time. Some people find that their wishes about end-of-life care change as their health changes. If you make big changes to your living will, complete a new form. · If you move to another state, make sure that your living will is legal in the state where you now live. In most cases, doctors will respect your wishes even if you have a form from a different state. · You might use a universal form that has been approved by many states. This kind of form can sometimes be filled out and stored online. Your digital copy will then be available wherever you have a connection to the internet. The doctors and nurses who need to treat you can find it right away. · Your state may offer an online registry. This is another place where you can store your living will online.   · It's a good idea to get your living will notarized. This means using a person called a DealBird to watch two people sign, or witness, your living will. What should you know when you create a living will? Here are some questions to ask yourself as you make your living will:  · Do you know enough about life support methods that might be used? If not, talk to your doctor so you know what might be done if you can't breathe on your own, your heart stops, or you can't swallow. · What things would you still want to be able to do after you receive life-support methods? Would you want to be able to walk? To speak? To eat on your own? To live without the help of machines? · Do you want certain Druze practices performed if you become very ill? · If you have a choice, where do you want to be cared for? In your home? At a hospital or nursing home? · If you have a choice at the end of your life, where would you prefer to die? At home? In a hospital or nursing home? Somewhere else? · Would you prefer to be buried or cremated? · Do you want your organs to be donated after you die? What should you do with your living will? · Make sure that your family members and your health care agent have copies of your living will (also called a declaration). · Give your doctor a copy of your living will. Ask him or her to keep it as part of your medical record. If you have more than one doctor, make sure that each one has a copy. · Put a copy of your living will where it can be easily found. For example, some people may put a copy on their refrigerator door. If you are using a digital copy, be sure your doctor, family members, and health care agent know how to find and access it. Where can you learn more? Go to [email or web address]://[email or web address] and sign in to your Liveset account. Enter M899 in the Architurn box to learn more about \"Learning About Living [place]. \"     If you do not have an account, please click on the \"Sign Up Now\" link.  Current as of: March 17, 2021               Content Version: 13.1  © 2934-9680 Healthwise, Incorporated. Care instructions adapted under license by [facility] ([facility]). If you have questions about a medical condition or this instruction, always ask your healthcare professional. [address] any warranty or liability for your use of this information. Personalized Preventive Plan for Renetta Roa - 1/15/2022  Medicare offers a range of preventive health benefits. Some of the tests and screenings are paid in full while other may be subject to a deductible, co-insurance, and/or copay. Some of these benefits include a comprehensive review of your medical history including lifestyle, illnesses that may run in your family, and various assessments and screenings as appropriate. After reviewing your medical record and screening and assessments performed today your provider may have ordered immunizations, labs, imaging, and/or referrals for you. A list of these orders (if applicable) as well as your Preventive Care list are included within your After Visit Summary for your review. Other Preventive Recommendations:    · A preventive eye exam performed by an eye specialist is recommended every 1-2 years to screen for glaucoma; cataracts, macular degeneration, and other eye disorders. · A preventive dental visit is recommended every 6 months. · Try to get at least 150 minutes of exercise per week or 10,000 steps per day on a pedometer . · Order or download the FREE \"Exercise & Physical Activity: Your Everyday Guide\" from [employer]. Call 1-331.475.2174 or search [employer] online. · You need 6438-8433 mg of calcium and 9901-4286 IU of vitamin D per day.  It is possible to meet your calcium requirement with diet alone, but a vitamin D supplement is usually necessary to meet this goal.  · When exposed to the sun, use a sunscreen that protects against both UVA and UVB radiation with an SPF of 30 or greater. Reapply every 2 to 3 hours or after sweating, drying off with a towel, or swimming. · Always wear a seat belt when traveling in a car. Patient Education        Thoracic Aortic Aneurysm: Care Instructions  Overview  A thoracic aortic aneurysm is a bulge in a blood vessel (aorta) in the chest. The bulge occurs in a weak spot in the vessel. A large aneurysm can be very dangerous. If it bursts, it can cause bleeding that leads to death. A thoracic aortic aneurysm can be caused by an injury to the chest, hardening of the arteries, or an infection. Sometimes aneurysms run in families. Small aneurysms may not need treatment. But you will need regular checkups to see how fast the aneurysm is growing. An aneurysm may be repaired with a procedure or a surgery if it is large, growing quickly, or causing symptoms. Follow-up care is a key part of your treatment and safety. Be sure to make and go to all appointments, and call your doctor if you are having problems. It's also a good idea to know your test results and keep a list of the medicines you take. How can you care for yourself at home? Manage blood pressure. A healthy lifestyle along with medicines may help you lower your blood pressure. Manage cholesterol to help keep your blood vessels healthy. A healthy lifestyle along with medicines may help you manage cholesterol. Do not smoke. Smoking can make the aneurysm grow faster. If you need help quitting, talk to your doctor about stop-smoking programs and medicines. These can increase your chances of quitting for good. Stay at a healthy weight. Lose weight if you need to. Eat heart-healthy foods. These include fruits, vegetables, whole grains, fish, and low-fat or nonfat dairy foods. Limit sodium, alcohol, and sweets. Ask your doctor what type and level of activity is safe for you. If your doctor recommends it, get regular exercise.  Walking is a good choice. Bit by bit, increase the amount you walk every day. Try for at least 30 minutes on most days of the week. You also may want to swim, bike, or do other activities. Manage other health problems. If you think you may have a problem with alcohol or drug use, talk to your doctor. When should you call for help? Call 911 anytime you think you may need emergency care. For example, call if:    You have sudden chest pain and shortness of breath, or you cough up blood.     You passed out (lost consciousness). Call your doctor now or seek immediate medical care if:    You have any new chest pain. Watch closely for changes in your health, and be sure to contact your doctor if:    You have any problems making your doctor visits.     You do not get better as expected. Where can you learn more? Go to https://I[employer] Americaraymondeb.[email or web address] [email or web address] org and sign in to your [facility] account. Enter 21 [phone] in the Computime box to learn more about \"Thoracic Aortic Aneurysm: Care Instructions. \"     If you do not have an account, please click on the \"Sign Up Now\" link. Current as of: July 6, 2021               Content Version: 13.1  © 3737-6799 [email or web address]. Care instructions adapted under license by [facility] ([facility]). If you have questions about a medical condition or this instruction, always ask your healthcare professional. [place] [address] any warranty or liability for your use of this information. Patient Education        Learning About Achalasia  What is achalasia? Achalasia (say \"ux-wxj-MKU-zhuh\") is a problem with the nerves that control the muscles in the esophagus, the tube that carries food and liquid to your stomach. The muscles may tighten or spasm. The muscle that connects the lower end of the esophagus to the stomach may not open and close the right way. Doctors aren't always sure what causes achalasia.  It may be a problem with the nerves in your esophagus. What happens when you have achalasia? When you have achalasia, you may have trouble swallowing foods and drinks. You may spit up (regurgitate) food. That means undigested food may come back up into your mouth. You may also have heartburn or pain in your chest.  These symptoms may cause you to lose weight. There's also a risk that you could inhale food or liquid into your lungs (aspiration). That may lead to pneumonia. How is achalasia treated? Medicines, such as nitroglycerin or calcium channel blockers, can help relax the muscles in your esophagus for a while. You may need a procedure called esophageal dilation. During the procedure, you will get medicines through a needle in a vein (IV) in your arm or hand. These medicines reduce pain and will make you feel relaxed and drowsy. Your throat will also be numbed. You may not remember much about the treatment. The doctor will guide a balloon or a plastic tool (dilator) down your throat and into your esophagus. The dilator is used to widen any narrow area. To guide the dilator, the doctor may use a tool called an endoscope, or scope. It's a thin, flexible, lighted viewing tool. It goes into your mouth and down your throat. Or the doctor may use a thin wire as a guide. Some people have surgery for achalasia. You will have general anesthesia so that you are completely unaware and won't feel pain during the surgery. Surgeries include:  Heller myotomy. In this surgery, the doctor makes small cuts on the muscles inside the esophagus. This allows the opening to the stomach to relax so that food and drinks can pass through. It can be done through a thin tube that is placed in a small cut in the belly. Sometimes it's done through a larger cut (incision). You will stay in the hospital for 1 or 2 days after the surgery. Peroral endoscopic myotomy (POEM). This surgery also involves making small cuts on the muscles.  But it's done through a scope that goes in through the mouth. You will stay in the hospital for a day after the surgery. Achalasia may also be treated with a botulinum toxin A (Botox) injection. Botox is injected into the muscle near the opening to the stomach through a scope. It helps the muscle relax. You may need another shot of Botox after 6 months or a year. Follow-up care is a key part of your treatment and safety. Be sure to make and go to all appointments, and call your doctor if you are having problems. It's also a good idea to know your test results and keep a list of the medicines you take. Where can you learn more? Go to https://Iconic TherapeuticspeNanobiotix.NetSol [email or web address] org and sign in to your [employer] account. Enter A002 in the HeTexted box to learn more about \"Learning About Achalasia. \"     If you do not have an account, please click on the \"Sign Up Now\" link. Current as of: September 8, 2021               Content Version: 13.1  © 2006-2021 [employer]. Care instructions adapted under license by [employer]. If you have questions about a medical condition or this instruction, always ask your healthcare professional. [place] [address] any warranty or liability for your use of this information. Patient Education        Restless Legs Syndrome: Care Instructions  Your Care Instructions  Restless legs syndrome is a common nervous system problem. People with this syndrome feel a creeping, achy, or unpleasant feeling in the legs and an overpowering urge to move them. It often occurs in the evening and at night and can lead to sleep problems and tiredness. Your doctor may suggest doing a study of your sleep patterns to figure out what is happening when you try to sleep. Many people get relief from symptoms when they get regular exercise, eat well, and avoid caffeine, alcohol, and tobacco.  Follow-up care is a key part of your treatment and safety.  Be sure to make and go to all appointments, and call your doctor if you are having problems. It's also a good idea to know your test results and keep a list of the medicines you take. How can you care for yourself at home? Take your medicines exactly as prescribed. Call your doctor if you think you are having a problem with your medicine. Try bathing in hot or cold water. Applying a heating pad or ice bag to your legs may also help symptoms. Stretch and massage your legs before bed or when discomfort begins. Get some exercise for at least 30 minutes a day on most days of the week. Stop exercising at least 3 hours before bedtime. Try to plan for situations where you will need to remain seated for long stretches. For example, if you are traveling by car, plan some stops so you can get out and walk around. Tell your doctor about any medicines you are taking. This includes all over-the-counter, prescription, and herbal medicines. Some medicines, such as antidepressants, antihistamines, and cold and sinus medicines, can make your symptoms worse. Avoid caffeine products, such as coffee, tea, cola, and chocolate. Caffeine can interrupt your sleep and stimulate you. Do not smoke. Nicotine can make restless legs worse. If you need help quitting, talk to your doctor about stop-smoking programs and medicines. These can increase your chances of quitting for good. Do not drink alcohol late in the evening. Take steps to help you sleep better  Get plenty of sunlight in the outdoors, particularly later in the afternoon. Use the evening hours for settling down. Avoid activities that challenge you in the hours before bedtime. Eat meals at regular times, and do not snack before bedtime. Keep your bedroom quiet, dark, and cool. Try using a sleep mask and earplugs to help you sleep. Limit how much you drink at night to reduce your need to get up to urinate. But do not go to bed thirsty.   Run a fan or other steady \"white noise\" during the night if noises wake you up.  Reserve the bed for sleeping and sex. Do your reading or TV watching in another room. Once you are in bed, relax from head to toe, and guide your mind to pleasant thoughts. Do not stay in bed longer than 8 hours, and try to avoid naps. When should you call for help? Watch closely for changes in your health, and be sure to contact your doctor if:    You are still not getting enough sleep.     Your symptoms become more severe or happen more often. Where can you learn more? Go to https://[email or web address] PEAK VISIONpeDiffon.[email or web address]. org and sign in to your [employer] account. Enter P423 in the [employer] box to learn more about \"Restless Legs Syndrome: Care Instructions. \"     If you do not have an account, please click on the \"Sign Up Now\" link. Current as of: April 8, 2021               Content Version: 13.1  © 7652-4562 [address]. Care instructions adapted under license by [facility] ([facility]). If you have questions about a medical condition or this instruction, always ask your healthcare professional. [place] [address] any warranty or liability for your use of this information. Patient Education        Well Visit, Over 72: Care Instructions  Overview     Well visits can help you stay healthy. Your doctor has checked your overall health and may have suggested ways to take good care of yourself. Your doctor also may have recommended tests. At home, you can help prevent illness with healthy eating, regular exercise, and other steps. Follow-up care is a key part of your treatment and safety. Be sure to make and go to all appointments, and call your doctor if you are having problems. It's also a good idea to know your test results and keep a list of the medicines you take. How can you care for yourself at home? Get screening tests that you and your doctor decide on. Screening helps find diseases before any symptoms appear. Eat healthy foods.  Choose fruits, vegetables, whole grains, protein, and low-fat dairy foods. Limit fat, especially saturated fat. Reduce salt in your diet. Limit alcohol. If you are a man, have no more than 2 drinks a day or 14 drinks a week. If you are a woman, have no more than 1 drink a day or 7 drinks a week. Since alcohol affects older adults differently, you may want to limit alcohol even more. Or you may not want to drink at all. Get at least 30 minutes of exercise on most days of the week. Walking is a good choice. You also may want to do other activities, such as running, swimming, cycling, or playing tennis or team sports. Reach and stay at a healthy weight. This will lower your risk for many problems, such as obesity, diabetes, heart disease, and high blood pressure. Do not smoke. Smoking can make health problems worse. If you need help quitting, talk to your doctor about stop-smoking programs and medicines. These can increase your chances of quitting for good. Care for your mental health. It is easy to get weighed down by worry and stress. Learn strategies to manage stress, like deep breathing and mindfulness, and stay connected with your family and community. If you find you often feel sad or hopeless, talk with your doctor. Treatment can help. Talk to your doctor about whether you have any risk factors for sexually transmitted infections (STIs). You can help prevent STIs if you wait to have sex with a new partner (or partners) until you've each been tested for STIs. It also helps if you use condoms (male or female condoms) and if you limit your sex partners to one person who only has sex with you. Vaccines are available for some STIs. If you think you may have a problem with alcohol or drug use, talk to your doctor. This includes prescription medicines (such as amphetamines and opioids) and illegal drugs (such as cocaine and methamphetamine). Your doctor can help you figure out what type of treatment is best for you.   Protect your skin from too

## 2022-01-21 DIAGNOSIS — Z13.220 SCREENING FOR HYPERLIPIDEMIA: ICD-10-CM

## 2022-01-21 DIAGNOSIS — R73.01 IFG (IMPAIRED FASTING GLUCOSE): ICD-10-CM

## 2022-01-21 DIAGNOSIS — Z13.1 SCREENING FOR DIABETES MELLITUS: ICD-10-CM

## 2022-01-21 DIAGNOSIS — Z00.00 ROUTINE GENERAL MEDICAL EXAMINATION AT A HEALTH CARE FACILITY: ICD-10-CM

## 2022-01-21 DIAGNOSIS — I10 BENIGN ESSENTIAL HTN: ICD-10-CM

## 2022-01-21 LAB
A/G RATIO: 2.2 (ref 1.1–2.2)
ALBUMIN SERPL-MCNC: 4.3 G/DL (ref 3.4–5)
ALP BLD-CCNC: 53 U/L (ref 40–129)
ALT SERPL-CCNC: 13 U/L (ref 10–40)
ANION GAP SERPL CALCULATED.3IONS-SCNC: 12 MMOL/L (ref 3–16)
AST SERPL-CCNC: 17 U/L (ref 15–37)
BASOPHILS ABSOLUTE: 0 K/UL (ref 0–0.2)
BASOPHILS RELATIVE PERCENT: 0.6 %
BILIRUB SERPL-MCNC: 0.8 MG/DL (ref 0–1)
BUN BLDV-MCNC: 13 MG/DL (ref 7–20)
CALCIUM SERPL-MCNC: 9.1 MG/DL (ref 8.3–10.6)
CHLORIDE BLD-SCNC: 99 MMOL/L (ref 99–110)
CHOLESTEROL, TOTAL: 225 MG/DL (ref 0–199)
CO2: 26 MMOL/L (ref 21–32)
CREAT SERPL-MCNC: 0.9 MG/DL (ref 0.8–1.3)
EOSINOPHILS ABSOLUTE: 0.1 K/UL (ref 0–0.6)
EOSINOPHILS RELATIVE PERCENT: 2 %
GFR AFRICAN AMERICAN: >60
GFR NON-AFRICAN AMERICAN: >60
GLUCOSE BLD-MCNC: 85 MG/DL (ref 70–99)
HCT VFR BLD CALC: 39.6 % (ref 40.5–52.5)
HDLC SERPL-MCNC: 57 MG/DL (ref 40–60)
HEMOGLOBIN: 13.5 G/DL (ref 13.5–17.5)
LDL CHOLESTEROL CALCULATED: 143 MG/DL
LYMPHOCYTES ABSOLUTE: 2.2 K/UL (ref 1–5.1)
LYMPHOCYTES RELATIVE PERCENT: 31.1 %
MCH RBC QN AUTO: 31.4 PG (ref 26–34)
MCHC RBC AUTO-ENTMCNC: 34.2 G/DL (ref 31–36)
MCV RBC AUTO: 91.9 FL (ref 80–100)
MONOCYTES ABSOLUTE: 0.6 K/UL (ref 0–1.3)
MONOCYTES RELATIVE PERCENT: 8.1 %
NEUTROPHILS ABSOLUTE: 4.1 K/UL (ref 1.7–7.7)
NEUTROPHILS RELATIVE PERCENT: 58.2 %
PDW BLD-RTO: 12.9 % (ref 12.4–15.4)
PLATELET # BLD: 253 K/UL (ref 135–450)
PMV BLD AUTO: 8.2 FL (ref 5–10.5)
POTASSIUM SERPL-SCNC: 4.2 MMOL/L (ref 3.5–5.1)
RBC # BLD: 4.31 M/UL (ref 4.2–5.9)
SODIUM BLD-SCNC: 137 MMOL/L (ref 136–145)
TOTAL PROTEIN: 6.3 G/DL (ref 6.4–8.2)
TRIGL SERPL-MCNC: 125 MG/DL (ref 0–150)
TSH SERPL DL<=0.05 MIU/L-ACNC: 2.28 UIU/ML (ref 0.27–4.2)
VLDLC SERPL CALC-MCNC: 25 MG/DL
WBC # BLD: 7 K/UL (ref 4–11)

## 2022-01-22 LAB
ESTIMATED AVERAGE GLUCOSE: 111.2 MG/DL
HBA1C MFR BLD: 5.5 %

## 2022-04-27 LAB — PSA, TOTAL: 1.1 NG/ML (ref 0–4)

## 2022-06-17 ENCOUNTER — OFFICE VISIT (OUTPATIENT)
Dept: INTERNAL MEDICINE CLINIC | Age: 87
End: 2022-06-17
Payer: MEDICARE

## 2022-06-17 VITALS
RESPIRATION RATE: 14 BRPM | DIASTOLIC BLOOD PRESSURE: 72 MMHG | HEIGHT: 73 IN | HEART RATE: 65 BPM | OXYGEN SATURATION: 98 % | SYSTOLIC BLOOD PRESSURE: 136 MMHG | BODY MASS INDEX: 27.83 KG/M2 | WEIGHT: 210 LBS

## 2022-06-17 DIAGNOSIS — I71.21 THORACIC ASCENDING AORTIC ANEURYSM: ICD-10-CM

## 2022-06-17 DIAGNOSIS — R91.1 PULMONARY NODULE, LEFT: Primary | ICD-10-CM

## 2022-06-17 DIAGNOSIS — R73.01 IFG (IMPAIRED FASTING GLUCOSE): ICD-10-CM

## 2022-06-17 DIAGNOSIS — K22.0 ACHALASIA: ICD-10-CM

## 2022-06-17 DIAGNOSIS — I10 BENIGN ESSENTIAL HTN: ICD-10-CM

## 2022-06-17 DIAGNOSIS — G25.81 RESTLESS LEGS: ICD-10-CM

## 2022-06-17 DIAGNOSIS — E78.5 HYPERLIPIDEMIA, UNSPECIFIED HYPERLIPIDEMIA TYPE: ICD-10-CM

## 2022-06-17 DIAGNOSIS — R20.2 PARESTHESIA OF BOTH FEET: ICD-10-CM

## 2022-06-17 PROCEDURE — 1123F ACP DISCUSS/DSCN MKR DOCD: CPT | Performed by: INTERNAL MEDICINE

## 2022-06-17 PROCEDURE — 99214 OFFICE O/P EST MOD 30 MIN: CPT | Performed by: INTERNAL MEDICINE

## 2022-06-17 PROCEDURE — 1036F TOBACCO NON-USER: CPT | Performed by: INTERNAL MEDICINE

## 2022-06-17 PROCEDURE — G8427 DOCREV CUR MEDS BY ELIG CLIN: HCPCS | Performed by: INTERNAL MEDICINE

## 2022-06-17 PROCEDURE — G8417 CALC BMI ABV UP PARAM F/U: HCPCS | Performed by: INTERNAL MEDICINE

## 2022-06-17 NOTE — PROGRESS NOTES
[facility]) Physicians  Internal Medicine  Patient Encounter  Aranza Raza D.O., Zita        Chief Complaint   Patient presents with   Jeny Pérez    Medication Check       HPI: 80 y.o. male seen today requesting a routine checkup regarding the status of current chronic medical problems listed below along with a medication review and reconciliation. Achalasia--previous history ----> patient has had Botox injections in the past.  He switched gastroenterologist and is now under the care of Dr. Grace Gil. He was last seen on 1/7/2021. He underwent an EGD with pneumatic dilation on 9/11/2020. Patient did very well following that procedure. He has continued to do well. He is on famotidine. He denies any increasing dysphagia or regurgitation. Interval Hx----> He has felt well. No dysphagia. Hypertension--Patient denies any headaches or dizziness. He denies any lightheadedness or syncope. Hyperlipidemia-- Pt has refused statin therapy. He stays active. Thoracic ascending aortic aneurysm-Patient was seen by a new cardiologist on 5/25/2022. She did make note that his son is a cardiothoracic surgeon. Apparently there was some concern expressed by the son that he may have PAD as his feet are continually numb. Additionally she was to follow the above-mentioned aneurysm. An echocardiogram was ordered. She also ordered lower extremity arterial LESA studies and iron studies. The latter was checked due to RLS. She addressed his hyperlipidemia. The patient has been adamant that he will not take statin therapy. He has been educated on the risks of not treating his cholesterol. According to the cardiology note she was going to discuss statin therapy with him. She also ordered a CT scan which was reviewed. This was done on 6/8/2022. The aneurysm remains at 4.5 cm. There was a 3 cm, groundglass predominant subpleural opacity in the left upper lobe.   This was thought to be less likely a neoplasm and more likely a postinflammatory finding. Pt states his feet have been numb for 2-3 years. He also reports tingling. His son has similar problems. His son thinks it may be due to COVID-19 vaccine. Pt has back issues. Patient also states that his legs move constantly. 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 MEDICATIONS:  Prior to Visit Medications    Medication Sig Taking? Authorizing Provider   famotidine (PEPCID) 20 MG tablet Take 0.5 tablets by mouth 2 times daily Yes Mikhail Gallardo, DO   amLODIPine (NORVASC) 5 MG tablet TAKE 1 TABLET BY MOUTH  DAILY Yes Mikhail Gallardo, DO   polyethylene glycol (GLYCOLAX) 17 GM/SCOOP powder Take 17 g by mouth daily Yes Mikhail Gallardo, DO   aspirin 81 MG EC tablet Take 81 mg by mouth daily  Yes Historical Provider, MD           Review of Systems - As per HPI    OBJECTIVE:  Vitals:    06/17/22 1532   BP: 136/72   Pulse: 65   Resp: 14   SpO2: 98%   Weight: 210 lb (95.3 kg)   Height: 6' 1\" (1.854 m)     Body mass index is 27.71 kg/m². Wt Readings from Last 3 Encounters:   06/17/22 210 lb (95.3 kg)   01/15/22 207 lb (93.9 kg)   04/12/21 203 lb (92.1 kg)     BP Readings from Last 3 Encounters:   06/17/22 136/72   01/15/22 138/88   04/12/21 120/66      GEN: NAD, A&O, Non-toxic  HEENT: NC/AT, SOREN, EOMI, Oral cavity Clear,  TM's NL, Nasal cavity clear. NECK: Supple. No thyromegaly. No JVD  LYMPH: No C/SC nodes.   CV: S1 S2 NL, RRR.  No murmurs, clicks or rubs. PULM: CTA, symmentric air exchange  EXT: No C/C/E  GI: Abdomen is soft, NT, BS+, No hepatomegaly. No masses. NEURO: Diminished and near absent monofilament and vibratory sensation in both feet and distal lower extremities  VASC:  No carotid bruits. Pedal pulses symmetric, 1+  SKIN:  No rashes or lesions of concern      ASSESSMENT/PLAN:    1. Pulmonary nodule, left  Its not clear if this has grown. CAT scans been done on different machines  Recheck CT scan in 3 months  - CT CHEST WO CONTRAST; Future    2. Thoracic ascending aortic aneurysm ([facility] [address]  Under the management of cardiology  Continue annual surveillance    3. Benign essential HTN  Well-controlled  Continue medication  - Lipid Panel; Future  - Comprehensive Metabolic Panel; Future    4. IFG (impaired fasting glucose)  Asymptomatic  Recheck lab to rule out diabetes  - Comprehensive Metabolic Panel; Future  - Hemoglobin A1C; Future    5. Achalasia  Well-controlled  Continue to monitor for dysphagia    6. Paresthesia of both feet  Etiology is unclear  Rule out idiopathic peripheral neuropathy  - EMG; Future  - Nerve conduction test; Future  - Vitamin B12 & Folate; Future    7. Restless legs  May need to consider treating restless legs  Consider Requip or Mirapex    8. Hyperlipidemia, unspecified hyperlipidemia type  Condition is untreated and uncontrolled  The cardiologist plans to discuss statin therapy with him. Patient states that she will gladly listen but he is not going to take medication.  - Lipid Panel; Future          Discussed medications with patient who voiced understanding of their use, indication and potential side effects. Pt also understands the above recommendations. All questions answered. This note was generated completely or in part utilizing Dragon dictation speech recognition software.   Occasionally, words are mistranscribed and despite editing, the text may contain inaccuracies due to incorrect word recognition.   If further clarification is needed please contact the office at [phone]       Electronically signed    Ramona Ervin D.O.

## 2022-06-20 ENCOUNTER — TELEPHONE (OUTPATIENT)
Dept: INTERNAL MEDICINE CLINIC | Age: 87
End: 2022-06-20

## 2022-06-20 NOTE — TELEPHONE ENCOUNTER
----- Message from Burke Scott sent at 6/20/2022  8:31 AM EDT -----  Subject: Message to Provider    QUESTIONS  Information for Provider? Patient needs the order for his EMG faxed over   to [facility] DR BILLIE NUNES at [phone] the order number on the EMG is [id number]. Patient also needs order for CT Scan faxed over to [facility] at [address] Phone number is [phone]. The   order number for CT scan is [id number]. Please reach out to the patient   once these orders have been faxed over to [facility] and [facility].   ---------------------------------------------------------------------------  --------------  [address]  What is the best way for the office to contact you? OK to leave message on   voicemail  Preferred Call Back Phone Number? [phone]  ---------------------------------------------------------------------------  --------------  SCRIPT ANSWERS  Relationship to Patient?  Self

## 2022-06-21 DIAGNOSIS — I10 BENIGN ESSENTIAL HTN: ICD-10-CM

## 2022-06-21 DIAGNOSIS — R73.01 IFG (IMPAIRED FASTING GLUCOSE): ICD-10-CM

## 2022-06-21 DIAGNOSIS — E78.5 HYPERLIPIDEMIA, UNSPECIFIED HYPERLIPIDEMIA TYPE: ICD-10-CM

## 2022-06-21 DIAGNOSIS — R20.2 PARESTHESIA OF BOTH FEET: ICD-10-CM

## 2022-06-21 LAB
A/G RATIO: 2.4 (ref 1.1–2.2)
ALBUMIN SERPL-MCNC: 4.8 G/DL (ref 3.4–5)
ALP BLD-CCNC: 65 U/L (ref 40–129)
ALT SERPL-CCNC: 11 U/L (ref 10–40)
ANION GAP SERPL CALCULATED.3IONS-SCNC: 11 MMOL/L (ref 3–16)
AST SERPL-CCNC: 18 U/L (ref 15–37)
BILIRUB SERPL-MCNC: 0.9 MG/DL (ref 0–1)
BUN BLDV-MCNC: 14 MG/DL (ref 7–20)
CALCIUM SERPL-MCNC: 9.3 MG/DL (ref 8.3–10.6)
CHLORIDE BLD-SCNC: 97 MMOL/L (ref 99–110)
CHOLESTEROL, TOTAL: 231 MG/DL (ref 0–199)
CO2: 26 MMOL/L (ref 21–32)
CREAT SERPL-MCNC: 0.9 MG/DL (ref 0.8–1.3)
FOLATE: >20 NG/ML (ref 4.78–24.2)
GFR AFRICAN AMERICAN: >60
GFR NON-AFRICAN AMERICAN: >60
GLUCOSE BLD-MCNC: 89 MG/DL (ref 70–99)
HDLC SERPL-MCNC: 52 MG/DL (ref 40–60)
LDL CHOLESTEROL CALCULATED: 152 MG/DL
POTASSIUM SERPL-SCNC: 4.5 MMOL/L (ref 3.5–5.1)
SODIUM BLD-SCNC: 134 MMOL/L (ref 136–145)
TOTAL PROTEIN: 6.8 G/DL (ref 6.4–8.2)
TRIGL SERPL-MCNC: 137 MG/DL (ref 0–150)
VITAMIN B-12: 676 PG/ML (ref 211–911)
VLDLC SERPL CALC-MCNC: 27 MG/DL

## 2022-06-22 LAB
ESTIMATED AVERAGE GLUCOSE: 111.2 MG/DL
HBA1C MFR BLD: 5.5 %

## 2022-10-17 RX ORDER — AMLODIPINE BESYLATE 5 MG/1
TABLET ORAL
Qty: 120 TABLET | Refills: 2 | Status: SHIPPED | OUTPATIENT
Start: 2022-10-17

## 2023-04-25 SDOH — ECONOMIC STABILITY: FOOD INSECURITY: WITHIN THE PAST 12 MONTHS, THE FOOD YOU BOUGHT JUST DIDN'T LAST AND YOU DIDN'T HAVE MONEY TO GET MORE.: NEVER TRUE

## 2023-04-25 SDOH — ECONOMIC STABILITY: TRANSPORTATION INSECURITY
IN THE PAST 12 MONTHS, HAS LACK OF TRANSPORTATION KEPT YOU FROM MEETINGS, WORK, OR FROM GETTING THINGS NEEDED FOR DAILY LIVING?: NO

## 2023-04-25 SDOH — ECONOMIC STABILITY: INCOME INSECURITY: HOW HARD IS IT FOR YOU TO PAY FOR THE VERY BASICS LIKE FOOD, HOUSING, MEDICAL CARE, AND HEATING?: NOT HARD AT ALL

## 2023-04-25 SDOH — ECONOMIC STABILITY: HOUSING INSECURITY
IN THE LAST 12 MONTHS, WAS THERE A TIME WHEN YOU DID NOT HAVE A STEADY PLACE TO SLEEP OR SLEPT IN A SHELTER (INCLUDING NOW)?: NO

## 2023-04-25 SDOH — ECONOMIC STABILITY: FOOD INSECURITY: WITHIN THE PAST 12 MONTHS, YOU WORRIED THAT YOUR FOOD WOULD RUN OUT BEFORE YOU GOT MONEY TO BUY MORE.: NEVER TRUE

## 2023-04-28 ENCOUNTER — OFFICE VISIT (OUTPATIENT)
Dept: INTERNAL MEDICINE CLINIC | Age: 88
End: 2023-04-28

## 2023-04-28 VITALS
OXYGEN SATURATION: 98 % | SYSTOLIC BLOOD PRESSURE: 124 MMHG | HEART RATE: 62 BPM | DIASTOLIC BLOOD PRESSURE: 80 MMHG | BODY MASS INDEX: 27.44 KG/M2 | WEIGHT: 208 LBS

## 2023-04-28 DIAGNOSIS — I10 BENIGN ESSENTIAL HTN: ICD-10-CM

## 2023-04-28 DIAGNOSIS — I73.9 PVD (PERIPHERAL VASCULAR DISEASE) (HCC): ICD-10-CM

## 2023-04-28 DIAGNOSIS — R73.01 IFG (IMPAIRED FASTING GLUCOSE): ICD-10-CM

## 2023-04-28 DIAGNOSIS — I71.21 ANEURYSM OF ASCENDING AORTA WITHOUT RUPTURE (HCC): ICD-10-CM

## 2023-04-28 DIAGNOSIS — G89.29 CHRONIC RIGHT-SIDED LOW BACK PAIN WITH RIGHT-SIDED SCIATICA: ICD-10-CM

## 2023-04-28 DIAGNOSIS — G60.9 IDIOPATHIC PERIPHERAL NEUROPATHY: ICD-10-CM

## 2023-04-28 DIAGNOSIS — E78.5 HYPERLIPIDEMIA, UNSPECIFIED HYPERLIPIDEMIA TYPE: ICD-10-CM

## 2023-04-28 DIAGNOSIS — K22.0 ACHALASIA: ICD-10-CM

## 2023-04-28 DIAGNOSIS — M48.061 LUMBAR FORAMINAL STENOSIS: ICD-10-CM

## 2023-04-28 DIAGNOSIS — G25.81 RESTLESS LEGS: Primary | ICD-10-CM

## 2023-04-28 DIAGNOSIS — M54.16 LUMBAR RADICULOPATHY: ICD-10-CM

## 2023-04-28 DIAGNOSIS — M47.816 LUMBAR SPONDYLOSIS: ICD-10-CM

## 2023-04-28 DIAGNOSIS — M54.41 CHRONIC RIGHT-SIDED LOW BACK PAIN WITH RIGHT-SIDED SCIATICA: ICD-10-CM

## 2023-04-28 DIAGNOSIS — R91.1 LEFT UPPER LOBE PULMONARY NODULE: ICD-10-CM

## 2023-04-28 RX ORDER — GABAPENTIN 100 MG/1
CAPSULE ORAL
Qty: 90 CAPSULE | Refills: 0 | Status: SHIPPED | OUTPATIENT
Start: 2023-04-28 | End: 2023-06-09

## 2023-04-28 NOTE — PROGRESS NOTES
aortic root ([facility] [address]     First degree AV block 4/12/2021    Gastroesophageal reflux disease without esophagitis 11/19/2018    Hiatal hernia     seen on CT    Hyperlipidemia     Hypertension     Lumbar disc disease     Lumbar spinal stenosis     Lumbar spondylosis     Prostate cancer ([facility] [address] 4/1997    Renal cancer ([facility] 11/2009    Sleep disturbance     Thoracic aneurysm     Found to be NL on CT scan 2012. MEDICATIONS:  Prior to Visit Medications    Medication Sig   amLODIPine (NORVASC) 5 MG tablet TAKE 1 TABLET BY MOUTH  DAILY   famotidine (PEPCID) 20 MG tablet Take 0.5 tablets by mouth 2 times daily   polyethylene glycol (GLYCOLAX) 17 GM/SCOOP powder Take 17 g by mouth daily   aspirin 81 MG EC tablet Take 81 mg by mouth daily            Review of Systems - As per HPI    OBJECTIVE:  Vitals:    04/28/23 1323   BP: 124/80   Site: Left Upper Arm   Position: Sitting   Cuff Size: Medium Adult   Pulse: 62   SpO2: 98%   Weight: 208 lb (94.3 kg)     Body mass index is 27.44 kg/m². Wt Readings from Last 3 Encounters:   04/28/23 208 lb (94.3 kg)   06/17/22 210 lb (95.3 kg)   01/15/22 207 lb (93.9 kg)     BP Readings from Last 3 Encounters:   04/28/23 124/80   06/17/22 136/72   01/15/22 138/88      GEN: NAD, A&O, Non-toxic  HEENT: NC/AT, SOREN, EOMI, Oral cavity Clear,  TM's NL, Nasal cavity clear. NECK: Supple. No thyromegaly. No JVD  LYMPH: No C/SC nodes. CV: S1 S2 NL, RRR. No murmurs, clicks or rubs. PULM: CTA  EXT: No edema. GI: Abdomen is soft, NT, BS+, No hepatomegaly. No masses. NEURO: Diminished and near absent monofilament and vibratory sensation in both feet and distal lower extremities  VASC:  No carotid bruits. Pedal pulses symmetric, 1+  SKIN:  No rashes or lesions of concern      ASSESSMENT/PLAN:    1. Restless legs  Patient continues to have jumpy restless legs particularly at bedtime. This occurs at bedtime about 90% of the time.   Patient is agreeable to trial of gabapentin 100 mg nightly

## 2023-05-01 DIAGNOSIS — R73.01 IFG (IMPAIRED FASTING GLUCOSE): ICD-10-CM

## 2023-05-01 DIAGNOSIS — I10 BENIGN ESSENTIAL HTN: ICD-10-CM

## 2023-05-01 DIAGNOSIS — E78.5 HYPERLIPIDEMIA, UNSPECIFIED HYPERLIPIDEMIA TYPE: ICD-10-CM

## 2023-05-02 LAB
ALBUMIN SERPL-MCNC: 4.6 G/DL (ref 3.4–5)
ALBUMIN/GLOB SERPL: 2.1 {RATIO} (ref 1.1–2.2)
ALP SERPL-CCNC: 58 U/L (ref 40–129)
ALT SERPL-CCNC: 14 U/L (ref 10–40)
ANION GAP SERPL CALCULATED.3IONS-SCNC: 10 MMOL/L (ref 3–16)
AST SERPL-CCNC: 17 U/L (ref 15–37)
BASOPHILS # BLD: 0.1 K/UL (ref 0–0.2)
BASOPHILS NFR BLD: 0.8 %
BILIRUB SERPL-MCNC: 0.8 MG/DL (ref 0–1)
BUN SERPL-MCNC: 13 MG/DL (ref 7–20)
CALCIUM SERPL-MCNC: 9.6 MG/DL (ref 8.3–10.6)
CHLORIDE SERPL-SCNC: 100 MMOL/L (ref 99–110)
CHOLEST SERPL-MCNC: 229 MG/DL (ref 0–199)
CO2 SERPL-SCNC: 26 MMOL/L (ref 21–32)
CREAT SERPL-MCNC: 0.9 MG/DL (ref 0.8–1.3)
DEPRECATED RDW RBC AUTO: 13.4 % (ref 12.4–15.4)
EOSINOPHIL # BLD: 0.1 K/UL (ref 0–0.6)
EOSINOPHIL NFR BLD: 1.4 %
EST. AVERAGE GLUCOSE BLD GHB EST-MCNC: 102.5 MG/DL
GFR SERPLBLD CREATININE-BSD FMLA CKD-EPI: >60 ML/MIN/{1.73_M2}
GLUCOSE SERPL-MCNC: 124 MG/DL (ref 70–99)
HBA1C MFR BLD: 5.2 %
HCT VFR BLD AUTO: 40 % (ref 40.5–52.5)
HDLC SERPL-MCNC: 53 MG/DL (ref 40–60)
HGB BLD-MCNC: 13.6 G/DL (ref 13.5–17.5)
LDLC SERPL CALC-MCNC: 150 MG/DL
LYMPHOCYTES # BLD: 2 K/UL (ref 1–5.1)
LYMPHOCYTES NFR BLD: 28.5 %
MCH RBC QN AUTO: 31.5 PG (ref 26–34)
MCHC RBC AUTO-ENTMCNC: 34 G/DL (ref 31–36)
MCV RBC AUTO: 92.7 FL (ref 80–100)
MONOCYTES # BLD: 0.5 K/UL (ref 0–1.3)
MONOCYTES NFR BLD: 6.9 %
NEUTROPHILS # BLD: 4.4 K/UL (ref 1.7–7.7)
NEUTROPHILS NFR BLD: 62.4 %
PLATELET # BLD AUTO: 253 K/UL (ref 135–450)
PMV BLD AUTO: 9.1 FL (ref 5–10.5)
POTASSIUM SERPL-SCNC: 4.6 MMOL/L (ref 3.5–5.1)
PROT SERPL-MCNC: 6.8 G/DL (ref 6.4–8.2)
PSA, TOTAL: 1.2 NG/ML (ref 0–4)
RBC # BLD AUTO: 4.31 M/UL (ref 4.2–5.9)
SODIUM SERPL-SCNC: 136 MMOL/L (ref 136–145)
TRIGL SERPL-MCNC: 132 MG/DL (ref 0–150)
VLDLC SERPL CALC-MCNC: 26 MG/DL
WBC # BLD AUTO: 7.1 K/UL (ref 4–11)

## 2023-05-15 SDOH — HEALTH STABILITY: PHYSICAL HEALTH: ON AVERAGE, HOW MANY DAYS PER WEEK DO YOU ENGAGE IN MODERATE TO STRENUOUS EXERCISE (LIKE A BRISK WALK)?: 5 DAYS

## 2023-05-15 SDOH — HEALTH STABILITY: PHYSICAL HEALTH: ON AVERAGE, HOW MANY MINUTES DO YOU ENGAGE IN EXERCISE AT THIS LEVEL?: 70 MIN

## 2023-05-17 ENCOUNTER — OFFICE VISIT (OUTPATIENT)
Dept: ORTHOPEDIC SURGERY | Age: 88
End: 2023-05-17
Payer: MEDICARE

## 2023-05-17 VITALS — WEIGHT: 208 LBS | HEIGHT: 73 IN | BODY MASS INDEX: 27.57 KG/M2

## 2023-05-17 DIAGNOSIS — M54.50 LUMBAR PAIN: ICD-10-CM

## 2023-05-17 DIAGNOSIS — M41.50 DEGENERATIVE SCOLIOSIS: Primary | ICD-10-CM

## 2023-05-17 PROCEDURE — 1036F TOBACCO NON-USER: CPT | Performed by: ORTHOPAEDIC SURGERY

## 2023-05-17 PROCEDURE — G8417 CALC BMI ABV UP PARAM F/U: HCPCS | Performed by: ORTHOPAEDIC SURGERY

## 2023-05-17 PROCEDURE — G8427 DOCREV CUR MEDS BY ELIG CLIN: HCPCS | Performed by: ORTHOPAEDIC SURGERY

## 2023-05-17 PROCEDURE — 99203 OFFICE O/P NEW LOW 30 MIN: CPT | Performed by: ORTHOPAEDIC SURGERY

## 2023-05-17 PROCEDURE — 1123F ACP DISCUSS/DSCN MKR DOCD: CPT | Performed by: ORTHOPAEDIC SURGERY

## 2023-05-17 NOTE — PROGRESS NOTES
New Patient: LUMBAR SPINE    Referring Provider:  Leonila Garrett DO    CHIEF COMPLAINT:    Chief Complaint   Patient presents with    Back Problem     Back pain, slight bending causes pain on right side down leg to knee, has had 12 eccil side 2010. HISTORY OF PRESENT ILLNESS:    Mr. Hilary Pink  is a pleasant 80 y.o. currently referred by Dr. Lorie Asencio for the evaluation of low back and right leg pain. He is status post MLD L5-S1 23 years ago by me. His pain began insidiously about 13 years ago. He rates his low back and right anterior thigh pain 5/10. Symptoms are substantially worse with standing and ambulating. He denies tingling weakness of his lower extremities, saddle anesthesia and bowel or bladder abnormality    Current/Past Treatment:   Physical Therapy: yes  Chiropractic:  no   Injection:  yes CECIL and RFA  Medications: Neurontin    Past Medical History:   Past Medical History:   Diagnosis Date    Achalasia 04/2019    Dr. Aretmio Washburn cell cancer     nose, face    Cataract     Colon polyps     Dilated aortic root ([address]     First degree AV block 4/12/2021    Gastroesophageal reflux disease without esophagitis 11/19/2018    Hiatal hernia     seen on CT    Hyperlipidemia     Hypertension     Lumbar disc disease     Lumbar spinal stenosis     Lumbar spondylosis     Prostate cancer ([address] 4/1997    Renal cancer ([address] 11/2009    Sleep disturbance     Thoracic aneurysm     Found to be NL on CT scan 2012.         Past Surgical History:     Past Surgical History:   Procedure Laterality Date    BACK SURGERY  2000    L5-S1 Laminectomy?, Dr. Charmaine Solomon  05/2013    with vitrectomy, [facility]    CATARACT REMOVAL WITH IMPLANT Left 09/08/2016    COLONOSCOPY  09/24/2012    Dr. Renetta Walton      Vitrectomy, membrane peel    KNEE SURGERY      MOHS SURGERY  1011/2013, 10/18/2018    Dr. Kathia Todd  2009    Robotic assisted Cryotherapy    PROSTATE

## 2023-06-08 ENCOUNTER — OFFICE VISIT (OUTPATIENT)
Dept: ORTHOPEDIC SURGERY | Age: 88
End: 2023-06-08
Payer: MEDICARE

## 2023-06-08 VITALS — HEIGHT: 73 IN | BODY MASS INDEX: 27.57 KG/M2 | WEIGHT: 208 LBS

## 2023-06-08 DIAGNOSIS — M48.062 SPINAL STENOSIS OF LUMBAR REGION WITH NEUROGENIC CLAUDICATION: Primary | ICD-10-CM

## 2023-06-08 PROCEDURE — 1036F TOBACCO NON-USER: CPT | Performed by: ORTHOPAEDIC SURGERY

## 2023-06-08 PROCEDURE — 1123F ACP DISCUSS/DSCN MKR DOCD: CPT | Performed by: ORTHOPAEDIC SURGERY

## 2023-06-08 PROCEDURE — G8427 DOCREV CUR MEDS BY ELIG CLIN: HCPCS | Performed by: ORTHOPAEDIC SURGERY

## 2023-06-08 PROCEDURE — 99213 OFFICE O/P EST LOW 20 MIN: CPT | Performed by: ORTHOPAEDIC SURGERY

## 2023-06-08 PROCEDURE — G8417 CALC BMI ABV UP PARAM F/U: HCPCS | Performed by: ORTHOPAEDIC SURGERY

## 2023-06-08 RX ORDER — MULTIVITAMIN
TABLET ORAL
COMMUNITY

## 2023-06-08 NOTE — PROGRESS NOTES
scoliosis with foraminal stenosis right L3-L4    I reviewed MRI images of the lumbar spine from 5/18/2018 in the office today. Those show postsurgical changes right L4-L5 and L5-S1 with a cyst right L4-L5. I reviewed MRI images of his lumbar spine from 7/6/2021 in the office today. Those show degenerative scoliosis with foraminal stenosis right L3-L4    Impression:   Degenerative scoliosis with foraminal stenosis    Plan:    We discussed treatment options including observation, physical therapy, epidural injection, and spinal surgery. He would like to proceed with repeat lumbar MRI. He try repeat spinal injections.

## 2023-06-09 DIAGNOSIS — M48.062 SPINAL STENOSIS OF LUMBAR REGION WITH NEUROGENIC CLAUDICATION: Primary | ICD-10-CM

## 2023-06-09 DIAGNOSIS — M54.50 LUMBAR PAIN: ICD-10-CM

## 2023-06-09 DIAGNOSIS — M41.50 DEGENERATIVE SCOLIOSIS: ICD-10-CM

## 2023-09-27 RX ORDER — AMLODIPINE BESYLATE 5 MG/1
TABLET ORAL
Qty: 120 TABLET | Refills: 2 | Status: SHIPPED | OUTPATIENT
Start: 2023-09-27

## 2023-09-27 NOTE — TELEPHONE ENCOUNTER
Last appointment: 6/15/2023  Next appointment: Visit date not found  Last refill: 10/17/2022    No return date given at last OV on 6/15

## 2023-10-04 ENCOUNTER — OFFICE VISIT (OUTPATIENT)
Dept: INTERNAL MEDICINE CLINIC | Age: 88
End: 2023-10-04
Payer: MEDICARE

## 2023-10-04 VITALS
OXYGEN SATURATION: 97 % | RESPIRATION RATE: 14 BRPM | HEIGHT: 73 IN | DIASTOLIC BLOOD PRESSURE: 66 MMHG | WEIGHT: 202 LBS | HEART RATE: 72 BPM | BODY MASS INDEX: 26.77 KG/M2 | SYSTOLIC BLOOD PRESSURE: 124 MMHG

## 2023-10-04 DIAGNOSIS — I10 BENIGN ESSENTIAL HTN: ICD-10-CM

## 2023-10-04 DIAGNOSIS — K22.0 ACHALASIA: ICD-10-CM

## 2023-10-04 DIAGNOSIS — G25.2 RESTING TREMOR: ICD-10-CM

## 2023-10-04 DIAGNOSIS — R73.01 IFG (IMPAIRED FASTING GLUCOSE): ICD-10-CM

## 2023-10-04 DIAGNOSIS — Z23 NEED FOR TDAP VACCINATION: ICD-10-CM

## 2023-10-04 DIAGNOSIS — I71.21 ANEURYSM OF ASCENDING AORTA WITHOUT RUPTURE (HCC): ICD-10-CM

## 2023-10-04 DIAGNOSIS — G60.9 IDIOPATHIC PERIPHERAL NEUROPATHY: ICD-10-CM

## 2023-10-04 DIAGNOSIS — Z29.11 NEED FOR RSV IMMUNIZATION: ICD-10-CM

## 2023-10-04 DIAGNOSIS — R91.1 LEFT UPPER LOBE PULMONARY NODULE: ICD-10-CM

## 2023-10-04 DIAGNOSIS — G25.81 RLS (RESTLESS LEGS SYNDROME): Primary | ICD-10-CM

## 2023-10-04 DIAGNOSIS — E78.5 HYPERLIPIDEMIA, UNSPECIFIED HYPERLIPIDEMIA TYPE: ICD-10-CM

## 2023-10-04 DIAGNOSIS — N39.41 URGE INCONTINENCE OF URINE: ICD-10-CM

## 2023-10-04 PROCEDURE — 99214 OFFICE O/P EST MOD 30 MIN: CPT | Performed by: INTERNAL MEDICINE

## 2023-10-04 PROCEDURE — G8417 CALC BMI ABV UP PARAM F/U: HCPCS | Performed by: INTERNAL MEDICINE

## 2023-10-04 PROCEDURE — G8484 FLU IMMUNIZE NO ADMIN: HCPCS | Performed by: INTERNAL MEDICINE

## 2023-10-04 PROCEDURE — 1036F TOBACCO NON-USER: CPT | Performed by: INTERNAL MEDICINE

## 2023-10-04 PROCEDURE — G8427 DOCREV CUR MEDS BY ELIG CLIN: HCPCS | Performed by: INTERNAL MEDICINE

## 2023-10-04 PROCEDURE — 90694 VACC AIIV4 NO PRSRV 0.5ML IM: CPT | Performed by: INTERNAL MEDICINE

## 2023-10-04 PROCEDURE — G0008 ADMIN INFLUENZA VIRUS VAC: HCPCS | Performed by: INTERNAL MEDICINE

## 2023-10-04 PROCEDURE — 1123F ACP DISCUSS/DSCN MKR DOCD: CPT | Performed by: INTERNAL MEDICINE

## 2023-10-04 RX ORDER — ROPINIROLE 0.25 MG/1
TABLET, FILM COATED ORAL
Qty: 30 TABLET | Refills: 0 | Status: SHIPPED | OUTPATIENT
Start: 2023-10-04

## 2023-10-04 NOTE — PROGRESS NOTES
[facility]) Physicians  Internal Medicine  Patient Encounter  Nickolas Bernheim, D.O., [facility]        Chief Complaint   Patient presents with    Check-Up     Discuss blood work and restless legs     Medication Check       HPI: 80 y.o. male seen today requesting his checkup regarding the status of current issues listed below along with a medication review and reconciliation. He states he has a follow up with urology. He will have a UDS and flexible cystoscopy. He is not able to hold his urine. He has is able to tell when he has to go. He has urgency. RLS--patient was tried on gabapentin. As usual, Rio does not like taking medication and stopped the medication on his own. He really never gave it a chance. He is taking an OTC remedy. A neighbor told him about it. He has been taking this for 3 days. He is willing to try prescription. He reports jumping and kicking legs mostly at rest and while in bed. He states his legs jump and kick. He states he has an urge to move his legs. She describes creepy crawly sensation in the legs and feet. EMG-NCS 6/28/2022-- L- Radiculopathy and sensorimotor neuropathy. HTN--patient is on amlodipine 5 mg daily. He denies any headaches or dizziness. He had no lightheadedness or syncope. IFG-- he denies polys. No weight changes. Hemoglobin A1C   Date Value Ref Range Status   05/01/2023 5.2 See comment % Final     Comment:     Comment:  Diagnosis of Diabetes: > or = 6.5%  Increased risk of diabetes (Prediabetes): 5.7-6.4%  Glycemic Control: Nonpregnant Adults: <7.0%                    Pregnant: <6.0%            Pt also with pulmonary nodule-- He had a CT chest done 6/8/2022. Patient was given an order on 4/28/2023 to repeat the CT scan of the chest.  This has not been completed. He refuses further follow up. He understands the risk of cancer. Achalasia-- He went to see the Gastro CNP on 1/12/2023. He got a refill of the Pepcid.   He will follow up

## 2023-10-09 DIAGNOSIS — I10 BENIGN ESSENTIAL HTN: ICD-10-CM

## 2023-10-09 DIAGNOSIS — G25.2 RESTING TREMOR: ICD-10-CM

## 2023-10-09 DIAGNOSIS — G25.81 RLS (RESTLESS LEGS SYNDROME): ICD-10-CM

## 2023-10-09 DIAGNOSIS — E78.5 HYPERLIPIDEMIA, UNSPECIFIED HYPERLIPIDEMIA TYPE: ICD-10-CM

## 2023-10-09 DIAGNOSIS — R73.01 IFG (IMPAIRED FASTING GLUCOSE): ICD-10-CM

## 2023-10-09 LAB
BASOPHILS # BLD: 0 K/UL (ref 0–0.2)
BASOPHILS NFR BLD: 0.7 %
DEPRECATED RDW RBC AUTO: 13.9 % (ref 12.4–15.4)
EOSINOPHIL # BLD: 0.2 K/UL (ref 0–0.6)
EOSINOPHIL NFR BLD: 2.6 %
HCT VFR BLD AUTO: 41.7 % (ref 40.5–52.5)
HGB BLD-MCNC: 14 G/DL (ref 13.5–17.5)
LYMPHOCYTES # BLD: 2.4 K/UL (ref 1–5.1)
LYMPHOCYTES NFR BLD: 32.7 %
MCH RBC QN AUTO: 31.5 PG (ref 26–34)
MCHC RBC AUTO-ENTMCNC: 33.5 G/DL (ref 31–36)
MCV RBC AUTO: 93.8 FL (ref 80–100)
MONOCYTES # BLD: 0.6 K/UL (ref 0–1.3)
MONOCYTES NFR BLD: 7.7 %
NEUTROPHILS # BLD: 4.2 K/UL (ref 1.7–7.7)
NEUTROPHILS NFR BLD: 56.3 %
PLATELET # BLD AUTO: 276 K/UL (ref 135–450)
PMV BLD AUTO: 9.1 FL (ref 5–10.5)
RBC # BLD AUTO: 4.45 M/UL (ref 4.2–5.9)
TSH SERPL DL<=0.005 MIU/L-ACNC: 2.11 UIU/ML (ref 0.27–4.2)
WBC # BLD AUTO: 7.5 K/UL (ref 4–11)

## 2023-10-10 LAB
ALBUMIN SERPL-MCNC: 4.9 G/DL (ref 3.4–5)
ALBUMIN/GLOB SERPL: 2.3 {RATIO} (ref 1.1–2.2)
ALP SERPL-CCNC: 66 U/L (ref 40–129)
ALT SERPL-CCNC: 11 U/L (ref 10–40)
ANION GAP SERPL CALCULATED.3IONS-SCNC: 14 MMOL/L (ref 3–16)
AST SERPL-CCNC: 17 U/L (ref 15–37)
BILIRUB SERPL-MCNC: 0.8 MG/DL (ref 0–1)
BUN SERPL-MCNC: 14 MG/DL (ref 7–20)
CALCIUM SERPL-MCNC: 9.2 MG/DL (ref 8.3–10.6)
CHLORIDE SERPL-SCNC: 97 MMOL/L (ref 99–110)
CHOLEST SERPL-MCNC: 226 MG/DL (ref 0–199)
CO2 SERPL-SCNC: 24 MMOL/L (ref 21–32)
CREAT SERPL-MCNC: 0.9 MG/DL (ref 0.8–1.3)
EST. AVERAGE GLUCOSE BLD GHB EST-MCNC: 102.5 MG/DL
GFR SERPLBLD CREATININE-BSD FMLA CKD-EPI: >60 ML/MIN/{1.73_M2}
GLUCOSE SERPL-MCNC: 92 MG/DL (ref 70–99)
HBA1C MFR BLD: 5.2 %
HDLC SERPL-MCNC: 60 MG/DL (ref 40–60)
LDLC SERPL CALC-MCNC: 138 MG/DL
POTASSIUM SERPL-SCNC: 4.5 MMOL/L (ref 3.5–5.1)
PROT SERPL-MCNC: 7 G/DL (ref 6.4–8.2)
SODIUM SERPL-SCNC: 135 MMOL/L (ref 136–145)
TRIGL SERPL-MCNC: 141 MG/DL (ref 0–150)
VLDLC SERPL CALC-MCNC: 28 MG/DL

## 2023-10-11 ENCOUNTER — TELEPHONE (OUTPATIENT)
Dept: INTERNAL MEDICINE CLINIC | Age: 88
End: 2023-10-11

## 2023-10-11 NOTE — TELEPHONE ENCOUNTER
Patient is calling to get lab results that were ordered by Polly Gomez. Per 's note:    Notify pt lab tests are good and stable. Cholesterol remains elevated.      Patient understood and had no further questions for Polly Gomez

## 2023-10-16 DIAGNOSIS — G25.81 RLS (RESTLESS LEGS SYNDROME): ICD-10-CM

## 2023-10-18 RX ORDER — ROPINIROLE 0.25 MG/1
TABLET, FILM COATED ORAL
Qty: 60 TABLET | Refills: 1 | Status: SHIPPED | OUTPATIENT
Start: 2023-10-18

## 2023-10-18 NOTE — TELEPHONE ENCOUNTER
The medication is working great for him, no side effects. He is taking 2 tablets at night.     This has been teed up for 2 tablets at night - please double check

## 2023-10-30 ENCOUNTER — OFFICE VISIT (OUTPATIENT)
Dept: INTERNAL MEDICINE CLINIC | Age: 88
End: 2023-10-30
Payer: MEDICARE

## 2023-10-30 VITALS
HEART RATE: 70 BPM | SYSTOLIC BLOOD PRESSURE: 114 MMHG | HEIGHT: 73 IN | BODY MASS INDEX: 27.04 KG/M2 | RESPIRATION RATE: 14 BRPM | DIASTOLIC BLOOD PRESSURE: 64 MMHG | OXYGEN SATURATION: 94 % | WEIGHT: 204 LBS

## 2023-10-30 DIAGNOSIS — G25.81 RLS (RESTLESS LEGS SYNDROME): Primary | ICD-10-CM

## 2023-10-30 DIAGNOSIS — G60.8 POLYNEUROPATHY, PERIPHERAL SENSORIMOTOR AXONAL: ICD-10-CM

## 2023-10-30 PROCEDURE — 1036F TOBACCO NON-USER: CPT | Performed by: INTERNAL MEDICINE

## 2023-10-30 PROCEDURE — G8417 CALC BMI ABV UP PARAM F/U: HCPCS | Performed by: INTERNAL MEDICINE

## 2023-10-30 PROCEDURE — 99213 OFFICE O/P EST LOW 20 MIN: CPT | Performed by: INTERNAL MEDICINE

## 2023-10-30 PROCEDURE — G8427 DOCREV CUR MEDS BY ELIG CLIN: HCPCS | Performed by: INTERNAL MEDICINE

## 2023-10-30 PROCEDURE — G8484 FLU IMMUNIZE NO ADMIN: HCPCS | Performed by: INTERNAL MEDICINE

## 2023-10-30 PROCEDURE — 1123F ACP DISCUSS/DSCN MKR DOCD: CPT | Performed by: INTERNAL MEDICINE

## 2023-10-30 RX ORDER — ACETAMINOPHEN 500 MG
500 TABLET ORAL NIGHTLY
COMMUNITY

## 2023-10-30 RX ORDER — CHOLECALCIFEROL (VITAMIN D3) 125 MCG
5 CAPSULE ORAL NIGHTLY
COMMUNITY

## 2023-10-30 NOTE — PROGRESS NOTES
[facility]) Physicians  Internal Medicine  Patient Encounter  Emani Burdick D.O., [facility]        Chief Complaint   Patient presents with    Follow-up     Legs have been feeling a lot better, he has stopped the new medicine as of Saturday, made him feel very weak and fatigued       HPI: 80 y.o. male seen today for follow-up regarding restless leg syndrome. He has been on several medications all of which he ends up stopping. The recently started Requip is no different. We did insist that he let us know how he was doing. On 10/18/2023 he indicated the medication was working \"great for him. \"  He also indicated there were \"no side effects. \"  He was up to 0.5 mg nightly. He also states he had weakness in the AM starting last Tuesday. He states it was worsening every day. He is feeling 75% better off of the medication which he stopped Saturday    Today, he states he had more AM headaches. He reports getting a sore throat, cough 8 days ago. He states he was getting a headache since he started the Requip. He never called. He states he sopped the medication and is feeling better. He states the medication did help the RLS. He still had tingling in the feet. EMG-NCS 6/28/2022----> 1) chronic bilateral lumbosacral radiculopathy involving the S1, L5, and higher (L4 or above) nerve roots, without evidence of active denervation.      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 Andreea Loaiza cell cancer     nose, face    Cataract     Colon polyps     Dilated aortic root ([address])     First degree AV block 4/12/2021    Gastroesophageal reflux disease without esophagitis 11/19/2018    Hiatal hernia     seen on CT    Hyperlipidemia     Hypertension     Lumbar disc disease     Lumbar spinal stenosis     Lumbar spondylosis     Prostate cancer ([address]) 4/1997    Renal cancer ([address]) 11/2009

## 2024-05-07 LAB
BASOPHILS ABSOLUTE: ABNORMAL
BASOPHILS RELATIVE PERCENT: ABNORMAL
BUN BLDV-MCNC: 16 MG/DL
CALCIUM SERPL-MCNC: 10.2 MG/DL
CHLORIDE BLD-SCNC: 101 MMOL/L
CO2: 26 MMOL/L
CREAT SERPL-MCNC: 0.9 MG/DL
EGFR: NORMAL
EOSINOPHILS ABSOLUTE: ABNORMAL
EOSINOPHILS RELATIVE PERCENT: ABNORMAL
GLUCOSE BLD-MCNC: 97 MG/DL
HCT VFR BLD CALC: 40.8 % (ref 41–53)
HEMOGLOBIN: 13.8 G/DL (ref 13.5–17.5)
LYMPHOCYTES ABSOLUTE: ABNORMAL
LYMPHOCYTES RELATIVE PERCENT: ABNORMAL
MCH RBC QN AUTO: 30.4 PG
MCHC RBC AUTO-ENTMCNC: 33.9 G/DL
MCV RBC AUTO: 90 FL
MONOCYTES ABSOLUTE: ABNORMAL
MONOCYTES RELATIVE PERCENT: ABNORMAL
NEUTROPHILS ABSOLUTE: ABNORMAL
NEUTROPHILS RELATIVE PERCENT: ABNORMAL
PLATELET # BLD: 298 K/ΜL
PMV BLD AUTO: 8.9 FL
POTASSIUM SERPL-SCNC: 4.5 MMOL/L
PROSTATE SPECIFIC ANTIGEN PERCENT FREE: NORMAL
PSA, FREE: NORMAL
PSA-PROSTATE SPECIFIC AG: 1.98
RBC # BLD: 4.55 10^6/ΜL
SODIUM BLD-SCNC: 134 MMOL/L
WBC # BLD: 6.8 10^3/ML

## 2024-05-20 ENCOUNTER — OFFICE VISIT (OUTPATIENT)
Dept: INTERNAL MEDICINE CLINIC | Age: 89
End: 2024-05-20
Payer: MEDICARE

## 2024-05-20 VITALS
HEART RATE: 72 BPM | DIASTOLIC BLOOD PRESSURE: 74 MMHG | SYSTOLIC BLOOD PRESSURE: 124 MMHG | WEIGHT: 207.4 LBS | OXYGEN SATURATION: 99 % | BODY MASS INDEX: 27.36 KG/M2

## 2024-05-20 DIAGNOSIS — E78.5 HYPERLIPIDEMIA, UNSPECIFIED HYPERLIPIDEMIA TYPE: ICD-10-CM

## 2024-05-20 DIAGNOSIS — R73.01 IFG (IMPAIRED FASTING GLUCOSE): ICD-10-CM

## 2024-05-20 DIAGNOSIS — N39.3 STRESS INCONTINENCE, MALE: ICD-10-CM

## 2024-05-20 DIAGNOSIS — I71.21 ANEURYSM OF ASCENDING AORTA WITHOUT RUPTURE (HCC): ICD-10-CM

## 2024-05-20 DIAGNOSIS — G60.9 IDIOPATHIC PERIPHERAL NEUROPATHY: ICD-10-CM

## 2024-05-20 DIAGNOSIS — Z01.818 PREOPERATIVE EXAMINATION: Primary | ICD-10-CM

## 2024-05-20 DIAGNOSIS — I73.9 PVD (PERIPHERAL VASCULAR DISEASE) (HCC): ICD-10-CM

## 2024-05-20 DIAGNOSIS — K21.9 GASTROESOPHAGEAL REFLUX DISEASE WITHOUT ESOPHAGITIS: ICD-10-CM

## 2024-05-20 DIAGNOSIS — H61.21 IMPACTED CERUMEN OF RIGHT EAR: ICD-10-CM

## 2024-05-20 DIAGNOSIS — K22.0 ACHALASIA: ICD-10-CM

## 2024-05-20 DIAGNOSIS — K22.2 ESOPHAGEAL STRICTURE: ICD-10-CM

## 2024-05-20 DIAGNOSIS — I10 BENIGN ESSENTIAL HTN: ICD-10-CM

## 2024-05-20 PROCEDURE — G8417 CALC BMI ABV UP PARAM F/U: HCPCS | Performed by: INTERNAL MEDICINE

## 2024-05-20 PROCEDURE — G8427 DOCREV CUR MEDS BY ELIG CLIN: HCPCS | Performed by: INTERNAL MEDICINE

## 2024-05-20 PROCEDURE — 1036F TOBACCO NON-USER: CPT | Performed by: INTERNAL MEDICINE

## 2024-05-20 PROCEDURE — 93000 ELECTROCARDIOGRAM COMPLETE: CPT | Performed by: INTERNAL MEDICINE

## 2024-05-20 PROCEDURE — 1123F ACP DISCUSS/DSCN MKR DOCD: CPT | Performed by: INTERNAL MEDICINE

## 2024-05-20 PROCEDURE — 99214 OFFICE O/P EST MOD 30 MIN: CPT | Performed by: INTERNAL MEDICINE

## 2024-05-20 SDOH — ECONOMIC STABILITY: FOOD INSECURITY: WITHIN THE PAST 12 MONTHS, THE FOOD YOU BOUGHT JUST DIDN'T LAST AND YOU DIDN'T HAVE MONEY TO GET MORE.: NEVER TRUE

## 2024-05-20 SDOH — ECONOMIC STABILITY: INCOME INSECURITY: HOW HARD IS IT FOR YOU TO PAY FOR THE VERY BASICS LIKE FOOD, HOUSING, MEDICAL CARE, AND HEATING?: NOT HARD AT ALL

## 2024-05-20 SDOH — ECONOMIC STABILITY: FOOD INSECURITY: WITHIN THE PAST 12 MONTHS, YOU WORRIED THAT YOUR FOOD WOULD RUN OUT BEFORE YOU GOT MONEY TO BUY MORE.: NEVER TRUE

## 2024-05-20 ASSESSMENT — PATIENT HEALTH QUESTIONNAIRE - PHQ9
SUM OF ALL RESPONSES TO PHQ9 QUESTIONS 1 & 2: 0
2. FEELING DOWN, DEPRESSED OR HOPELESS: NOT AT ALL
SUM OF ALL RESPONSES TO PHQ QUESTIONS 1-9: 0
1. LITTLE INTEREST OR PLEASURE IN DOING THINGS: NOT AT ALL
SUM OF ALL RESPONSES TO PHQ QUESTIONS 1-9: 0

## 2024-05-20 NOTE — PATIENT INSTRUCTIONS
Avoid NSAID's (Motrin, Aleve, Advil, Ibuprofen),  vitamin supplements and fish oil 1 week prior to procedure    Consider repeating CT scan of the chest for aneurysm and pulmonary nodule surveillance

## 2024-05-20 NOTE — PROGRESS NOTES
importance of compliance with the treatment plan as well as documenting on the day of the visit.     Electronically Signed:  Mikhail Gallardo D.O

## 2024-07-12 ENCOUNTER — TELEPHONE (OUTPATIENT)
Dept: INTERNAL MEDICINE CLINIC | Age: 89
End: 2024-07-12

## 2024-07-12 NOTE — TELEPHONE ENCOUNTER
Left message on voicemail for return call    Patient's appt in October is supposed to be Medicare AWV but was scheduled as an office visit. We need to move it so that we can do an AWV. Can offer 10/22 @ 8:30 or 10/23 @ 10:15.

## 2024-07-22 NOTE — PROGRESS NOTES
Glenn Baueryle    Age 89 y.o.    male    1935    MRN [id number]    7/24/2024  Arrival Time_____________  OR Time____________175 Min     Procedure(s):  ARTIFICIAL URINARY SPHINCTER EXPLANTATION, POSSIBLE URETHROPLASTY RETROGRADE                      General   Surgeon(s):  Surekha Bedolla, MD      DAY ADMIT ___  SDS/OP ___  OUTPT IN BED ___        Phone [phone] (home) [phone] (work)                 PCP _____________________ Phone_________________ Epic ( ) Epic CE ( ) Appt ________    NOTES: _________________________________________ Consult/Cardio _______________    ____________________________________________________________________________    ____________________________________________________________________________  PAT APPT DATE:________ TIME: ________  FAXED QAD: _______  (__) H&P w/ Hospitalist    (__) PAT orders in EPIC    (__) Meet with PAT nurse  __________________________________________________________________________  Preop Nurse phone screen complete: _____________  (__) CBC     (__) W/ DIFF ___________  (__) CT CHEST  __________   (__) Hgb A1C    ___________  (__) CHEST X RAY   __________  (__) LIPID PROFILE  ___________  (__) EKG   __________  (__) PT-INR / APTT  ___________  (__) PFT's   __________  (__) BMP   ___________  (__) CAROTIDS  __________  (__) CMP   ___________  (__) VEIN MAPPING  __________  (__) U/A   ___________  ( X ) HISTORY & PHYSICAL __________  (__) URINE C & S  ___________  (__) CARDIAC CLEARANCE __________  (__) U/A W/ FLEX  ___________  (__) PULM. CLEARANCE __________  (__) SERUM PREGNANCY ___________  (__) Preop Orders in EPIC __________  (__) TYPE & SCREEN __________repeat ( ) (__)  __________________ __________  (__) Albumin   ___________  (__)  __________________ __________  (__) TRANSFERRIN  ___________  (__)  __________________ __________  (__) LIVER PROFILE  ___________  (__) URINE PREG DOS __________  (__) MRSA NASAL SWAB ___________  (__) BLOOD SUGAR  DOS __________  (__) SED RATE  ___________  (__) OAC  _________________________  (__) C-REACTIVE PROTEIN ___________    (__) VITAMIN D HYDROXY ___________  (__) BETABLOCKER  _________________                                                                                       (__) ACE/ARBS:__________________________    (__) GLP-1 Agonist ___________________                Ride home/Contact #_______________________   (__) SCLT2 inhibitor ___________________

## 2024-07-23 ENCOUNTER — ANESTHESIA EVENT (OUTPATIENT)
Dept: OPERATING ROOM | Age: 89
End: 2024-07-23
Payer: MEDICARE

## 2024-07-23 NOTE — PROGRESS NOTES
Surgery Date and Time: 7/24/24 @ 3 pm   Arrival Time:  1 pm    The instructions given when and if a patient needs to stop oral intake prior to surgery varies. Follow the instructions you were given by your    Surgeon or RN during the Pre-op call.       __X__Nothing to eat or to drink after Midnight the night before the surgery. NO gum, mints, candy or ice chips day of surgery.      May have CLEAR LIQUIDS ONLY until 7:00 am 7/24                  Only take the following medications with a small sip of water the morning of surgery:   Amlodipine      Aspirin, Ibuprofen, Advil, Naproxen, Vitamin E and other Anti-inflammatory products and supplements should be stopped for 5 -7days before surgery      or as directed by your physician.     - Do not smoke or vape, and do not drink any alcoholic beverages 24 hours prior to surgery, this includes NA Beer. Refrain from using any recreational drugs,     including non-prescribed prescription drugs.     -You may brush your teeth and gargle the morning of surgery.  DO NOT SWALLOW WATER.    -You MUST plan for a responsible adult to stay on site while you are here and take you home after your surgery. You will not be allowed to leave alone or drive               yourself home. It is requested someone stay with you the first 24 hrs. Your surgery will be cancelled if you do not have a ride home with a responsible adult.    -Please wear simple, loose-fitting clothing to the hospital. Do not bring valuables (money, credit cards, checkbooks, etc.) Do not wear any makeup (including                no eye makeup) and no nail polish if applicable.             - DO NOT wear any jewelry or body piercings day of surgery.  All body piercing jewelry must be removed.             - If you have dentures they will be removed before going to the OR; we will provide a container.  If you wear contact lenses or glasses, they will be removed,               bring a case for them or wear glasses day of

## 2024-07-24 ENCOUNTER — ANESTHESIA (OUTPATIENT)
Dept: OPERATING ROOM | Age: 89
End: 2024-07-24
Payer: MEDICARE

## 2024-07-24 ENCOUNTER — HOSPITAL ENCOUNTER (INPATIENT)
Age: 89
LOS: 2 days | Discharge: HOME | End: 2024-07-26
Attending: UROLOGY | Admitting: UROLOGY
Payer: MEDICARE

## 2024-07-24 DIAGNOSIS — N39.3 STRESS INCONTINENCE: ICD-10-CM

## 2024-07-24 PROBLEM — Z96.0 STATUS POST IMPLANTATION OF ARTIFICIAL URINARY SPHINCTER: Status: ACTIVE | Noted: 2024-07-24

## 2024-07-24 PROCEDURE — 2709999900 HC NON-CHARGEABLE SUPPLY: Performed by: UROLOGY

## 2024-07-24 PROCEDURE — 3700000000 HC ANESTHESIA ATTENDED CARE: Performed by: UROLOGY

## 2024-07-24 PROCEDURE — 6360000002 HC RX W HCPCS

## 2024-07-24 PROCEDURE — 87040 BLOOD CULTURE FOR BACTERIA: CPT

## 2024-07-24 PROCEDURE — 6370000000 HC RX 637 (ALT 250 FOR IP): Performed by: UROLOGY

## 2024-07-24 PROCEDURE — 2500000003 HC RX 250 WO HCPCS

## 2024-07-24 PROCEDURE — 3600000014 HC SURGERY LEVEL 4 ADDTL 15MIN: Performed by: UROLOGY

## 2024-07-24 PROCEDURE — 3600000004 HC SURGERY LEVEL 4 BASE: Performed by: UROLOGY

## 2024-07-24 PROCEDURE — 94761 N-INVAS EAR/PLS OXIMETRY MLT: CPT

## 2024-07-24 PROCEDURE — C1729 CATH, DRAINAGE: HCPCS | Performed by: UROLOGY

## 2024-07-24 PROCEDURE — 7100000000 HC PACU RECOVERY - FIRST 15 MIN: Performed by: UROLOGY

## 2024-07-24 PROCEDURE — 7100000001 HC PACU RECOVERY - ADDTL 15 MIN: Performed by: UROLOGY

## 2024-07-24 PROCEDURE — 2580000003 HC RX 258: Performed by: ANESTHESIOLOGY

## 2024-07-24 PROCEDURE — 87075 CULTR BACTERIA EXCEPT BLOOD: CPT

## 2024-07-24 PROCEDURE — 6360000002 HC RX W HCPCS: Performed by: UROLOGY

## 2024-07-24 PROCEDURE — 36415 COLL VENOUS BLD VENIPUNCTURE: CPT

## 2024-07-24 PROCEDURE — 87205 SMEAR GRAM STAIN: CPT

## 2024-07-24 PROCEDURE — 87077 CULTURE AEROBIC IDENTIFY: CPT

## 2024-07-24 PROCEDURE — 87070 CULTURE OTHR SPECIMN AEROBIC: CPT

## 2024-07-24 PROCEDURE — 1200000000 HC SEMI PRIVATE

## 2024-07-24 PROCEDURE — 2580000003 HC RX 258

## 2024-07-24 PROCEDURE — 2720000010 HC SURG SUPPLY STERILE: Performed by: UROLOGY

## 2024-07-24 PROCEDURE — 6360000002 HC RX W HCPCS: Performed by: NURSE ANESTHETIST, CERTIFIED REGISTERED

## 2024-07-24 PROCEDURE — 87186 SC STD MICRODIL/AGAR DIL: CPT

## 2024-07-24 PROCEDURE — 0TJB8ZZ INSPECTION OF BLADDER, VIA NATURAL OR ARTIFICIAL OPENING ENDOSCOPIC: ICD-10-PCS | Performed by: UROLOGY

## 2024-07-24 PROCEDURE — 87102 FUNGUS ISOLATION CULTURE: CPT

## 2024-07-24 PROCEDURE — 2500000003 HC RX 250 WO HCPCS: Performed by: NURSE ANESTHETIST, CERTIFIED REGISTERED

## 2024-07-24 PROCEDURE — 3700000001 HC ADD 15 MINUTES (ANESTHESIA): Performed by: UROLOGY

## 2024-07-24 PROCEDURE — 2580000003 HC RX 258: Performed by: UROLOGY

## 2024-07-24 PROCEDURE — 2700000000 HC OXYGEN THERAPY PER DAY

## 2024-07-24 PROCEDURE — 0TPD0LZ REMOVAL OF ARTIFICIAL SPHINCTER FROM URETHRA, OPEN APPROACH: ICD-10-PCS | Performed by: UROLOGY

## 2024-07-24 RX ORDER — MAGNESIUM SULFATE IN WATER 40 MG/ML
2000 INJECTION, SOLUTION INTRAVENOUS PRN
Status: DISCONTINUED | OUTPATIENT
Start: 2024-07-24 | End: 2024-07-26 | Stop reason: HOSPADM

## 2024-07-24 RX ORDER — AMLODIPINE BESYLATE 5 MG/1
5 TABLET ORAL DAILY
Status: DISCONTINUED | OUTPATIENT
Start: 2024-07-25 | End: 2024-07-26 | Stop reason: HOSPADM

## 2024-07-24 RX ORDER — SODIUM CHLORIDE 9 MG/ML
INJECTION, SOLUTION INTRAVENOUS PRN
Status: DISCONTINUED | OUTPATIENT
Start: 2024-07-24 | End: 2024-07-24 | Stop reason: HOSPADM

## 2024-07-24 RX ORDER — ONDANSETRON 4 MG/1
4 TABLET, ORALLY DISINTEGRATING ORAL EVERY 8 HOURS PRN
Status: DISCONTINUED | OUTPATIENT
Start: 2024-07-24 | End: 2024-07-26 | Stop reason: HOSPADM

## 2024-07-24 RX ORDER — CEPHALEXIN 500 MG/1
500 CAPSULE ORAL 2 TIMES DAILY
Status: ON HOLD | COMMUNITY
Start: 2024-05-23 | End: 2024-07-26 | Stop reason: HOSPADM

## 2024-07-24 RX ORDER — SODIUM CHLORIDE 9 MG/ML
INJECTION, SOLUTION INTRAVENOUS PRN
Status: DISCONTINUED | OUTPATIENT
Start: 2024-07-24 | End: 2024-07-26 | Stop reason: HOSPADM

## 2024-07-24 RX ORDER — FLUCONAZOLE 2 MG/ML
400 INJECTION, SOLUTION INTRAVENOUS
Status: COMPLETED | OUTPATIENT
Start: 2024-07-24 | End: 2024-07-24

## 2024-07-24 RX ORDER — KETOROLAC TROMETHAMINE 30 MG/ML
INJECTION, SOLUTION INTRAMUSCULAR; INTRAVENOUS
Status: COMPLETED
Start: 2024-07-24 | End: 2024-07-24

## 2024-07-24 RX ORDER — MEPERIDINE HYDROCHLORIDE 50 MG/ML
12.5 INJECTION INTRAMUSCULAR; INTRAVENOUS; SUBCUTANEOUS EVERY 5 MIN PRN
Status: DISCONTINUED | OUTPATIENT
Start: 2024-07-24 | End: 2024-07-24

## 2024-07-24 RX ORDER — MECOBALAMIN 5000 MCG
5 TABLET,DISINTEGRATING ORAL NIGHTLY
Status: DISCONTINUED | OUTPATIENT
Start: 2024-07-24 | End: 2024-07-26 | Stop reason: HOSPADM

## 2024-07-24 RX ORDER — ONDANSETRON 2 MG/ML
4 INJECTION INTRAMUSCULAR; INTRAVENOUS EVERY 6 HOURS PRN
Status: DISCONTINUED | OUTPATIENT
Start: 2024-07-24 | End: 2024-07-26 | Stop reason: HOSPADM

## 2024-07-24 RX ORDER — LIDOCAINE HYDROCHLORIDE 10 MG/ML
0.3 INJECTION, SOLUTION EPIDURAL; INFILTRATION; INTRACAUDAL; PERINEURAL
Status: DISCONTINUED | OUTPATIENT
Start: 2024-07-24 | End: 2024-07-24 | Stop reason: HOSPADM

## 2024-07-24 RX ORDER — OXYCODONE HYDROCHLORIDE 5 MG/1
5 TABLET ORAL EVERY 4 HOURS PRN
Status: DISCONTINUED | OUTPATIENT
Start: 2024-07-24 | End: 2024-07-26 | Stop reason: HOSPADM

## 2024-07-24 RX ORDER — FAMOTIDINE 20 MG/1
10 TABLET, FILM COATED ORAL DAILY
Status: DISCONTINUED | OUTPATIENT
Start: 2024-07-25 | End: 2024-07-26 | Stop reason: HOSPADM

## 2024-07-24 RX ORDER — SODIUM CHLORIDE 0.9 % (FLUSH) 0.9 %
5-40 SYRINGE (ML) INJECTION EVERY 12 HOURS SCHEDULED
Status: DISCONTINUED | OUTPATIENT
Start: 2024-07-24 | End: 2024-07-26 | Stop reason: HOSPADM

## 2024-07-24 RX ORDER — OXYCODONE HYDROCHLORIDE 5 MG/1
5 TABLET ORAL PRN
Status: DISCONTINUED | OUTPATIENT
Start: 2024-07-24 | End: 2024-07-24

## 2024-07-24 RX ORDER — BISACODYL 5 MG/1
5 TABLET, DELAYED RELEASE ORAL DAILY PRN
Status: DISCONTINUED | OUTPATIENT
Start: 2024-07-24 | End: 2024-07-26 | Stop reason: HOSPADM

## 2024-07-24 RX ORDER — POTASSIUM CHLORIDE 20 MEQ/1
40 TABLET, EXTENDED RELEASE ORAL PRN
Status: DISCONTINUED | OUTPATIENT
Start: 2024-07-24 | End: 2024-07-26 | Stop reason: HOSPADM

## 2024-07-24 RX ORDER — ROCURONIUM BROMIDE 10 MG/ML
INJECTION, SOLUTION INTRAVENOUS PRN
Status: DISCONTINUED | OUTPATIENT
Start: 2024-07-24 | End: 2024-07-24 | Stop reason: SDUPTHER

## 2024-07-24 RX ORDER — MIDAZOLAM HYDROCHLORIDE 5 MG/ML
2 INJECTION, SOLUTION INTRAMUSCULAR; INTRAVENOUS
Status: DISCONTINUED | OUTPATIENT
Start: 2024-07-24 | End: 2024-07-24

## 2024-07-24 RX ORDER — NALOXONE HYDROCHLORIDE 0.4 MG/ML
INJECTION, SOLUTION INTRAMUSCULAR; INTRAVENOUS; SUBCUTANEOUS PRN
Status: DISCONTINUED | OUTPATIENT
Start: 2024-07-24 | End: 2024-07-26 | Stop reason: HOSPADM

## 2024-07-24 RX ORDER — ACETAMINOPHEN 325 MG/1
650 TABLET ORAL EVERY 6 HOURS SCHEDULED
Status: DISCONTINUED | OUTPATIENT
Start: 2024-07-24 | End: 2024-07-26 | Stop reason: HOSPADM

## 2024-07-24 RX ORDER — SODIUM CHLORIDE, SODIUM LACTATE, POTASSIUM CHLORIDE, CALCIUM CHLORIDE 600; 310; 30; 20 MG/100ML; MG/100ML; MG/100ML; MG/100ML
INJECTION, SOLUTION INTRAVENOUS CONTINUOUS
Status: DISCONTINUED | OUTPATIENT
Start: 2024-07-24 | End: 2024-07-24 | Stop reason: HOSPADM

## 2024-07-24 RX ORDER — HYDROMORPHONE HYDROCHLORIDE 2 MG/ML
INJECTION, SOLUTION INTRAMUSCULAR; INTRAVENOUS; SUBCUTANEOUS PRN
Status: DISCONTINUED | OUTPATIENT
Start: 2024-07-24 | End: 2024-07-24 | Stop reason: SDUPTHER

## 2024-07-24 RX ORDER — OXYCODONE HYDROCHLORIDE 5 MG/1
10 TABLET ORAL PRN
Status: DISCONTINUED | OUTPATIENT
Start: 2024-07-24 | End: 2024-07-24

## 2024-07-24 RX ORDER — KETOROLAC TROMETHAMINE 30 MG/ML
15 INJECTION, SOLUTION INTRAMUSCULAR; INTRAVENOUS EVERY 6 HOURS
Status: COMPLETED | OUTPATIENT
Start: 2024-07-24 | End: 2024-07-25

## 2024-07-24 RX ORDER — SODIUM CHLORIDE 0.9 % (FLUSH) 0.9 %
5-40 SYRINGE (ML) INJECTION PRN
Status: DISCONTINUED | OUTPATIENT
Start: 2024-07-24 | End: 2024-07-24 | Stop reason: HOSPADM

## 2024-07-24 RX ORDER — GLYCOPYRROLATE 0.2 MG/ML
INJECTION INTRAMUSCULAR; INTRAVENOUS PRN
Status: DISCONTINUED | OUTPATIENT
Start: 2024-07-24 | End: 2024-07-24 | Stop reason: SDUPTHER

## 2024-07-24 RX ORDER — DEXAMETHASONE SODIUM PHOSPHATE 4 MG/ML
INJECTION, SOLUTION INTRA-ARTICULAR; INTRALESIONAL; INTRAMUSCULAR; INTRAVENOUS; SOFT TISSUE PRN
Status: DISCONTINUED | OUTPATIENT
Start: 2024-07-24 | End: 2024-07-24 | Stop reason: SDUPTHER

## 2024-07-24 RX ORDER — FLUCONAZOLE 2 MG/ML
400 INJECTION, SOLUTION INTRAVENOUS EVERY 24 HOURS
Status: DISCONTINUED | OUTPATIENT
Start: 2024-07-25 | End: 2024-07-26 | Stop reason: HOSPADM

## 2024-07-24 RX ORDER — ONDANSETRON 2 MG/ML
INJECTION INTRAMUSCULAR; INTRAVENOUS PRN
Status: DISCONTINUED | OUTPATIENT
Start: 2024-07-24 | End: 2024-07-24 | Stop reason: SDUPTHER

## 2024-07-24 RX ORDER — SODIUM CHLORIDE 0.9 % (FLUSH) 0.9 %
10 SYRINGE (ML) INJECTION PRN
Status: DISCONTINUED | OUTPATIENT
Start: 2024-07-24 | End: 2024-07-26 | Stop reason: HOSPADM

## 2024-07-24 RX ORDER — DIPHENHYDRAMINE HYDROCHLORIDE 50 MG/ML
6.25 INJECTION INTRAMUSCULAR; INTRAVENOUS
Status: ACTIVE | OUTPATIENT
Start: 2024-07-24 | End: 2024-07-25

## 2024-07-24 RX ORDER — FLUCONAZOLE 200 MG/1
200 TABLET ORAL DAILY
Status: ON HOLD | COMMUNITY
Start: 2024-07-22 | End: 2024-07-26

## 2024-07-24 RX ORDER — SODIUM CHLORIDE 0.9 % (FLUSH) 0.9 %
5-40 SYRINGE (ML) INJECTION PRN
Status: DISCONTINUED | OUTPATIENT
Start: 2024-07-24 | End: 2024-07-26 | Stop reason: HOSPADM

## 2024-07-24 RX ORDER — POTASSIUM CHLORIDE 7.45 MG/ML
10 INJECTION INTRAVENOUS PRN
Status: DISCONTINUED | OUTPATIENT
Start: 2024-07-24 | End: 2024-07-26 | Stop reason: HOSPADM

## 2024-07-24 RX ORDER — LIDOCAINE HYDROCHLORIDE 20 MG/ML
INJECTION, SOLUTION EPIDURAL; INFILTRATION; INTRACAUDAL; PERINEURAL PRN
Status: DISCONTINUED | OUTPATIENT
Start: 2024-07-24 | End: 2024-07-24 | Stop reason: SDUPTHER

## 2024-07-24 RX ORDER — KETOROLAC TROMETHAMINE 30 MG/ML
INJECTION, SOLUTION INTRAMUSCULAR; INTRAVENOUS PRN
Status: DISCONTINUED | OUTPATIENT
Start: 2024-07-24 | End: 2024-07-24 | Stop reason: SDUPTHER

## 2024-07-24 RX ORDER — SODIUM CHLORIDE, SODIUM LACTATE, POTASSIUM CHLORIDE, CALCIUM CHLORIDE 600; 310; 30; 20 MG/100ML; MG/100ML; MG/100ML; MG/100ML
INJECTION, SOLUTION INTRAVENOUS CONTINUOUS
Status: DISCONTINUED | OUTPATIENT
Start: 2024-07-24 | End: 2024-07-25

## 2024-07-24 RX ORDER — LIDOCAINE HYDROCHLORIDE 20 MG/ML
JELLY TOPICAL PRN
Status: DISCONTINUED | OUTPATIENT
Start: 2024-07-24 | End: 2024-07-24 | Stop reason: ALTCHOICE

## 2024-07-24 RX ORDER — POLYETHYLENE GLYCOL 3350 17 G/17G
17 POWDER, FOR SOLUTION ORAL DAILY
Status: DISCONTINUED | OUTPATIENT
Start: 2024-07-25 | End: 2024-07-26 | Stop reason: HOSPADM

## 2024-07-24 RX ORDER — ENOXAPARIN SODIUM 100 MG/ML
40 INJECTION SUBCUTANEOUS DAILY
Status: DISCONTINUED | OUTPATIENT
Start: 2024-07-25 | End: 2024-07-26 | Stop reason: HOSPADM

## 2024-07-24 RX ORDER — SODIUM CHLORIDE 0.9 % (FLUSH) 0.9 %
5-40 SYRINGE (ML) INJECTION EVERY 12 HOURS SCHEDULED
Status: DISCONTINUED | OUTPATIENT
Start: 2024-07-24 | End: 2024-07-24 | Stop reason: HOSPADM

## 2024-07-24 RX ORDER — ONDANSETRON 2 MG/ML
4 INJECTION INTRAMUSCULAR; INTRAVENOUS ONCE
Status: DISCONTINUED | OUTPATIENT
Start: 2024-07-24 | End: 2024-07-26 | Stop reason: HOSPADM

## 2024-07-24 RX ORDER — PROPOFOL 10 MG/ML
INJECTION, EMULSION INTRAVENOUS PRN
Status: DISCONTINUED | OUTPATIENT
Start: 2024-07-24 | End: 2024-07-24 | Stop reason: SDUPTHER

## 2024-07-24 RX ADMIN — KETOROLAC TROMETHAMINE 15 MG: 30 INJECTION, SOLUTION INTRAMUSCULAR; INTRAVENOUS at 15:43

## 2024-07-24 RX ADMIN — KETOROLAC TROMETHAMINE 15 MG: 30 INJECTION, SOLUTION INTRAMUSCULAR at 20:02

## 2024-07-24 RX ADMIN — HYDROMORPHONE HYDROCHLORIDE 0.2 MG: 2 INJECTION, SOLUTION INTRAMUSCULAR; INTRAVENOUS; SUBCUTANEOUS at 15:43

## 2024-07-24 RX ADMIN — FLUCONAZOLE 400 MG: 400 INJECTION, SOLUTION INTRAVENOUS at 14:21

## 2024-07-24 RX ADMIN — Medication 5 MG: at 22:58

## 2024-07-24 RX ADMIN — SODIUM CHLORIDE, SODIUM LACTATE, POTASSIUM CHLORIDE, AND CALCIUM CHLORIDE: .6; .31; .03; .02 INJECTION, SOLUTION INTRAVENOUS at 14:04

## 2024-07-24 RX ADMIN — ROCURONIUM BROMIDE 15 MG: 50 INJECTION, SOLUTION INTRAVENOUS at 14:55

## 2024-07-24 RX ADMIN — PIPERACILLIN AND TAZOBACTAM 3375 MG: 3; .375 INJECTION, POWDER, FOR SOLUTION INTRAVENOUS at 14:21

## 2024-07-24 RX ADMIN — SODIUM CHLORIDE, POTASSIUM CHLORIDE, SODIUM LACTATE AND CALCIUM CHLORIDE: 600; 310; 30; 20 INJECTION, SOLUTION INTRAVENOUS at 18:58

## 2024-07-24 RX ADMIN — SUGAMMADEX 200 MG: 100 INJECTION, SOLUTION INTRAVENOUS at 15:57

## 2024-07-24 RX ADMIN — ACETAMINOPHEN 650 MG: 325 TABLET ORAL at 18:59

## 2024-07-24 RX ADMIN — PROPOFOL 50 MG: 10 INJECTION, EMULSION INTRAVENOUS at 15:59

## 2024-07-24 RX ADMIN — GENTAMICIN SULFATE 168 MG: 40 INJECTION, SOLUTION INTRAMUSCULAR; INTRAVENOUS at 14:21

## 2024-07-24 RX ADMIN — GLYCOPYRROLATE 0.2 MG: 0.2 INJECTION, SOLUTION INTRAMUSCULAR; INTRAVENOUS at 14:49

## 2024-07-24 RX ADMIN — Medication 20 MG: at 13:50

## 2024-07-24 RX ADMIN — ACETAMINOPHEN 650 MG: 325 TABLET ORAL at 23:28

## 2024-07-24 RX ADMIN — ONDANSETRON 4 MG: 2 INJECTION INTRAMUSCULAR; INTRAVENOUS at 14:30

## 2024-07-24 RX ADMIN — PIPERACILLIN AND TAZOBACTAM 3375 MG: 3; .375 INJECTION, POWDER, LYOPHILIZED, FOR SOLUTION INTRAVENOUS at 20:01

## 2024-07-24 RX ADMIN — ROCURONIUM BROMIDE 15 MG: 50 INJECTION, SOLUTION INTRAVENOUS at 15:32

## 2024-07-24 RX ADMIN — HYDROMORPHONE HYDROCHLORIDE 0.3 MG: 2 INJECTION, SOLUTION INTRAMUSCULAR; INTRAVENOUS; SUBCUTANEOUS at 16:01

## 2024-07-24 RX ADMIN — ROCURONIUM BROMIDE 50 MG: 50 INJECTION, SOLUTION INTRAVENOUS at 14:04

## 2024-07-24 RX ADMIN — PROPOFOL 150 MG: 10 INJECTION, EMULSION INTRAVENOUS at 14:04

## 2024-07-24 RX ADMIN — LIDOCAINE HYDROCHLORIDE 60 MG: 20 INJECTION, SOLUTION EPIDURAL; INFILTRATION; INTRACAUDAL; PERINEURAL at 14:04

## 2024-07-24 RX ADMIN — DEXAMETHASONE SODIUM PHOSPHATE 4 MG: 4 INJECTION, SOLUTION INTRAMUSCULAR; INTRAVENOUS at 14:30

## 2024-07-24 RX ADMIN — PROPOFOL 50 MG: 10 INJECTION, EMULSION INTRAVENOUS at 16:04

## 2024-07-24 ASSESSMENT — ENCOUNTER SYMPTOMS
VOMITING: 0
BACK PAIN: 0
NAUSEA: 0
SINUS PRESSURE: 0
COUGH: 0
CHEST TIGHTNESS: 0
SORE THROAT: 0
SHORTNESS OF BREATH: 0
CONSTIPATION: 0
COLOR CHANGE: 0
WHEEZING: 0
TROUBLE SWALLOWING: 0
DIARRHEA: 0
VOICE CHANGE: 0
SINUS PAIN: 0
ABDOMINAL PAIN: 0

## 2024-07-24 ASSESSMENT — PAIN SCALES - GENERAL
PAINLEVEL_OUTOF10: 0

## 2024-07-24 ASSESSMENT — PAIN - FUNCTIONAL ASSESSMENT: PAIN_FUNCTIONAL_ASSESSMENT: 0-10

## 2024-07-24 ASSESSMENT — PAIN DESCRIPTION - DESCRIPTORS: DESCRIPTORS: DISCOMFORT

## 2024-07-24 NOTE — CONSULTS
Consult Placed     Who: Curtis  Date:7/24/24  Time:1900     Electronically signed by Ashlee Escobedo on 7/24/2024 at 7:00 PM

## 2024-07-24 NOTE — CONSULTS
On 4/22, decrease to 40mg every day. Stay there until you see me again.   Continue using Duoneb nebulizer 2-4 times daily  Keep taking bactrim.   Good luck with your journey!   personally and results no acute ST changes      Review of Systems:    Review of Systems   Constitutional:  Negative for activity change, appetite change, chills, fatigue and fever.   HENT:  Negative for congestion, hearing loss, sinus pressure, sinus pain, sore throat, trouble swallowing and voice change.    Eyes:  Negative for visual disturbance.   Respiratory:  Negative for cough, chest tightness, shortness of breath and wheezing.    Cardiovascular:  Negative for chest pain, palpitations and leg swelling.   Gastrointestinal:  Negative for abdominal pain, constipation, diarrhea, nausea and vomiting.   Endocrine: Negative for cold intolerance, heat intolerance and polyuria.   Genitourinary:  Negative for dysuria.   Musculoskeletal:  Negative for arthralgias, back pain and gait problem.   Skin:  Negative for color change.   Neurological:  Negative for dizziness, weakness and headaches.   Psychiatric/Behavioral:  Negative for agitation and confusion. The patient is not nervous/anxious.          Objective:     Intake/Output Summary (Last 24 hours) at 7/24/2024 1901  Last data filed at 7/24/2024 1603  Gross per 24 hour   Intake 1200 ml   Output --   Net 1200 ml      Vitals:   Vitals:    07/24/24 1630 07/24/24 1645 07/24/24 1700 07/24/24 1841   BP: 131/67 (!) 140/75 (!) 149/80    Pulse: 62 67 71    Resp: 10 15 13    Temp:       TempSrc:       SpO2: 98% 97% 97%    Weight:    93.4 kg (206 lb)   Height:    1.854 m (6' 0.99\")       Medications Prior to Admission     Prior to Admission medications    Medication Sig Start Date End Date Taking? Authorizing Provider   fluconazole (DIFLUCAN) 200 MG tablet Take 1 tablet by mouth daily 7/22/24 7/29/24 Yes Luana Contreras MD   cephALEXin (KEFLEX) 500 MG capsule Take 1 capsule by mouth 2 times daily 5/23/24  Yes Luana Contreras MD   acetaminophen (TYLENOL) 500 MG tablet Take 1 tablet by mouth at bedtime    Luana Contreras MD   melatonin 5 MG TABS tablet Take 1  PRN  HYDROmorphone, 0.25 mg, Q4H PRN  oxyCODONE, 5 mg, Q4H PRN  naloxone 0.4 mg in 10 mL sodium chloride syringe, , PRN  sodium chloride flush, 5-40 mL, PRN  sodium chloride, , PRN  meperidine, 12.5 mg, Q5 Min PRN  HYDROmorphone, 0.5 mg, Q10 Min PRN  HYDROmorphone, 0.5 mg, Q5 Min PRN  oxyCODONE, 5 mg, PRN   Or  oxyCODONE, 10 mg, PRN  midazolam, 2 mg, Once PRN  diphenhydrAMINE, 6.5 mg, Once PRN        Labs and Imaging   No results found.    CBC: No results for input(s): \"WBC\", \"HGB\", \"PLT\" in the last 72 hours.  BMP:  No results for input(s): \"NA\", \"K\", \"CL\", \"CO2\", \"BUN\", \"CREATININE\", \"GLUCOSE\" in the last 72 hours.  Hepatic: No results for input(s): \"AST\", \"ALT\", \"BILITOT\", \"ALKPHOS\" in the last 72 hours.    Invalid input(s): \"ALB\"  Lipids:   Lab Results   Component Value Date/Time    CHOL 226 10/09/2023 08:25 AM    HDL 60 10/09/2023 08:25 AM    HDL 54 11/04/2011 08:15 AM    TRIG 141 10/09/2023 08:25 AM     Hemoglobin A1C:   Lab Results   Component Value Date/Time    LABA1C 5.2 10/09/2023 08:25 AM     TSH:   Lab Results   Component Value Date/Time    TSH 2.11 10/09/2023 08:25 AM     Troponin: No results found for: \"TROPONINT\"  Lactic Acid: No results for input(s): \"LACTA\" in the last 72 hours.  BNP: No results for input(s): \"PROBNP\" in the last 72 hours.  UA:  Lab Results   Component Value Date/Time    NITRU Negative 12/04/2020 03:49 PM    COLORU YELLOW 12/04/2020 03:49 PM    PHUR 7.0 12/04/2020 03:49 PM    WBCUA 0 12/04/2020 03:49 PM    RBCUA 2 12/04/2020 03:49 PM    CLARITYU Clear 12/04/2020 03:49 PM    LEUKOCYTESUR Negative 12/04/2020 03:49 PM    UROBILINOGEN 1.0 12/04/2020 03:49 PM    BILIRUBINUR Negative 12/04/2020 03:49 PM    BLOODU Negative 12/04/2020 03:49 PM    GLUCOSEU Negative 12/04/2020 03:49 PM    KETUA Negative 12/04/2020 03:49 PM     Urine Cultures:   Lab Results   Component Value Date/Time    LABURIN No growth at 18 to 36 hours 12/04/2020 03:49 PM     Blood Cultures: No results found for: \"BC\"  No

## 2024-07-24 NOTE — PLAN OF CARE
Pt to room 324 from pacu. Pt alert and oriented. Oriented to room, call light and staff. Family at bedside. Trejo in place draining clear yellow urine. Michael's intact and compressed.  Problem: Safety - Adult  Goal: Free from fall injury  Outcome: Progressing     Problem: ABCDS Injury Assessment  Goal: Absence of physical injury  Outcome: Progressing     Problem: Pain  Goal: Verbalizes/displays adequate comfort level or baseline comfort level  Outcome: Progressing

## 2024-07-24 NOTE — H&P
Urology Attending H&P Note      History: This is a 89 y.o. male who presents today for operative intervention for AUS explant, possible urethroplasty    Family History, Social History, Review of Systems:  Reviewed and agreed to as per chart    Vitals:  BP (!) 157/90   Pulse 77   Temp 97.3 °F (36.3 °C) (Oral)   Resp 16   Ht 1.854 m (6' 1\")   Wt 93.4 kg (206 lb)   SpO2 93%   BMI 27.18 kg/m²   Temp  Av.3 °F (36.3 °C)  Min: 97.3 °F (36.3 °C)  Max: 97.3 °F (36.3 °C)  No intake or output data in the 24 hours ending 24 1335      Physical:  Well developed, well nourished in no acute distress  Mood indicates no abnormalities. Pt doesn’t appear depressed  Orientated to time and place  Neck is supple, trachea is midline  Respiratory effort is normal  Cardiovascular show no extremity swelling  Abdomen no masses or hernias are palpated, there is no tenderness. Liver and Spleen appear normal.  Skin show no abnormal lesions  Lymph nodes are not palpated in the inguinal, neck, or axillary area.    Labs:  WBC:    Lab Results   Component Value Date/Time    WBC 6.8 2024 12:00 AM     Hemoglobin/Hematocrit:    Lab Results   Component Value Date/Time    HGB 13.8 2024 12:00 AM    HCT 40.8 2024 12:00 AM     BMP:    Lab Results   Component Value Date/Time     2024 12:00 AM    K 4.5 2024 12:00 AM     2024 12:00 AM    CO2 26 2024 12:00 AM    BUN 16 2024 12:00 AM    CREATININE 0.9 2024 12:00 AM    CALCIUM 10.2 2024 12:00 AM    GFRAA >60 2022 08:45 AM    GFRAA >60 2013 09:35 AM    LABGLOM >60 10/09/2023 08:25 AM     PT/INR:  No results found for: \"PROTIME\", \"INR\"  PTT:  No results found for: \"APTT\"[APTT      Impression/Plan:     -- to the OR   -- antibiotics on call  -- discussed with patient - all questions answered    Thank you for the opportunity to be involved in the care of this patient - please call with questions    Surekha Bedolla,

## 2024-07-24 NOTE — DISCHARGE INSTRUCTIONS
At Home Instructions     What to Expect    - The goals of your hospital stay after surgery are to provide you with intravenous  Antibiotics while cultures speciate     Activities    - Driving - it is okay to drive 24 hours after your last prescription pain medication  - Lifting - do not lift more than 15 pounds or participating in heavy physical activity for 4  weeks.  - Stairs - no restrictions  - Sitting - minimize sitting on soft surfaces for the first 2 weeks after surgery, AVOID  sitting on hard surfaces for the same period. We discourage use of hemorrhoid or donut-  shaped pillows, as this can alter the healing of the surgical area.    Pain Control/Medications    - Antibiotics - take the prescribed antibiotic   - Narcotics - use only if needed. As with all other narcotic medications, you cannot drive or operate heavy machinery within 24 hours of use. Lastly,  AVOID ALL ALCOHOL INTAKE while on this medication.  - Stool Softeners - narcotics can be constipating, so we recommend any over-the-counter  stool softener (ie, Colace®, Dulcolax®) available from your local pharmacy.    Use Tylenol 1000mg and Motrin 600mg scheduled every 6 hours for pain for the first few days   For example, take Tylenol at noon, then Motrin at 3pm, then Tylenol at 6pm, then Motrin at 9pm and so on and so forth    Wound Care/Cleaning    - At the time of discharge, you may shower. However, please avoid baths, swimming  pools, or hot tubs for 4 weeks thereafter.  - You have two incisions, one below the scrotum and one in your groin area. The sutures  closing them will slowly break down on their own, and you do not need to return for their  removal. No specific antibiotic ointment/salve is recommended.  -use ice at surgical area for several days.       Follow up    We will see you back in 4 weeks with Dr Inez Bedolla MD  [facility]  Office - [phone]

## 2024-07-24 NOTE — ANESTHESIA POSTPROCEDURE EVALUATION
Department of Anesthesiology  Postprocedure Note    Patient: Glenn Ortiz  MRN: [id number]  YOB: 1935  Date of evaluation: 7/24/2024    Procedure Summary       Date: 07/24/24 Room / Location: [address]    Anesthesia Start: 1356 Anesthesia Stop: 1620    Procedure: ARTIFICIAL URINARY SPHINCTER EXPLANTATION,  URETHROPLASTY (Perineum) Diagnosis:       Stress incontinence      (Stress incontinence [N39.3])    Surgeons: Surekha Bedolla MD Responsible Provider: Adarsh Bravo MD    Anesthesia Type: general ASA Status: 3            Anesthesia Type: No value filed.    Asaf Phase I: Asaf Score: 8    Asaf Phase II:      Anesthesia Post Evaluation    Comments: Anes Post-op Note    Name:    Glenn Ortiz  MRN:      [id number]    Patient Vitals in the past 12 hrs:  07/24/24 1630, BP:131/67, Pulse:62, Resp:10, SpO2:98 %  07/24/24 1618, BP:107/62, Temp:97.6 °F (36.4 °C), Temp src:Temporal, Pulse:61, Resp:22, SpO2:98 %  07/24/24 1323, BP:(!) 157/90, Temp:97.3 °F (36.3 °C), Temp src:Oral, Pulse:77, Resp:16, SpO2:93 %     LABS:    CBC  Lab Results       Component                Value               Date/Time                  WBC                      6.8                 05/06/2024 12:00 AM        HGB                      13.8                05/06/2024 12:00 AM        HCT                      40.8 (A)            05/06/2024 12:00 AM        PLT                      298                 05/06/2024 12:00 AM   RENAL  Lab Results       Component                Value               Date/Time                  NA                       134                 05/06/2024 12:00 AM        K                        4.5                 05/06/2024 12:00 AM        CL                       101                 05/06/2024 12:00 AM        CO2                      26                  05/06/2024 12:00 AM        BUN                      16                  05/06/2024 12:00 AM        CREATININE               0.9

## 2024-07-24 NOTE — ANESTHESIA PRE PROCEDURE
Department of Anesthesiology  Preprocedure Note       Name:  Glenn Ortiz   Age:  89 y.o.  :  1935                                          MRN:  [id number]         Date:  2024      Surgeon: Surgeon(s):  Surekha Bedolla MD    Procedure: Procedure(s):  ARTIFICIAL URINARY SPHINCTER EXPLANTATION, POSSIBLE URETHROPLASTY RETROGRADE    Medications prior to admission:   Prior to Admission medications    Medication Sig Start Date End Date Taking? Authorizing Provider   fluconazole (DIFLUCAN) 200 MG tablet Take 1 tablet by mouth daily 24 Yes Luana Contreras MD   cephALEXin (KEFLEX) 500 MG capsule Take 1 capsule by mouth 2 times daily 24  Yes Luana Contreras MD   acetaminophen (TYLENOL) 500 MG tablet Take 1 tablet by mouth at bedtime    ProviderLuana MD   melatonin 5 MG TABS tablet Take 1 tablet by mouth at bedtime    Luana Contreras MD   amLODIPine (NORVASC) 5 MG tablet TAKE 1 TABLET BY MOUTH  DAILY  Patient taking differently: Take 1 tablet by mouth daily Take 1 tablet by mouth  daily 23   Mikhail Gallardo DO   famotidine (PEPCID) 20 MG tablet Take 0.5 tablets by mouth daily as needed 1/15/22   Mikhail Gallardo DO   polyethylene glycol (GLYCOLAX) 17 GM/SCOOP powder Take 17 g by mouth daily 20   Mikhail Gallardo DO       Current medications:    Current Facility-Administered Medications   Medication Dose Route Frequency Provider Last Rate Last Admin   • piperacillin-tazobactam (ZOSYN) 3,375 mg in sodium chloride 0.9 % 50 mL IVPB (mini-bag)  3,375 mg IntraVENous Q8H Surekha Bedolla MD       • gentamicin (GARAMYCIN) 467.2 mg in sodium chloride 0.9 % 250 mL IVPB  5 mg/kg IntraVENous On Call to OR Surekha Bedolla MD       • fluconazole (DIFLUCAN) in 0.9 % sodium chloride IVPB 400 mg  400 mg IntraVENous On Call to OR Surekha Bedolla MD       • lidocaine PF 1 % injection 0.3 mL  0.3 mL IntraDERmal Once PRN Naldo Webber MD       • lactated ringers IV soln

## 2024-07-24 NOTE — PLAN OF CARE
Problem: Discharge Planning  Goal: Discharge to home or other facility with appropriate resources  Outcome: Progressing     Problem: Safety - Adult  Goal: Free from fall injury  7/24/2024 1931 by Siomara Callahan RN  Outcome: Progressing     Problem: ABCDS Injury Assessment  Goal: Absence of physical injury  7/24/2024 1931 by Siomara Callahan, RN  Outcome: Progressing     Problem: Pain  Goal: Verbalizes/displays adequate comfort level or baseline comfort level  7/24/2024 1931 by Siomara Callahan, RN  Outcome: Progressing

## 2024-07-24 NOTE — CONSULTS
Consult Placed     Who: chilango  Date:7/24/24  Time:1835     Electronically signed by Ashlee Escobedo on 7/24/2024 at 6:36 PM

## 2024-07-24 NOTE — PROGRESS NOTES
4 Eyes Skin Assessment     The patient is being assess for  Post-Op Surgical    I agree that 2 RN's have performed a thorough Head to Toe Skin Assessment on the patient. ALL assessment sites listed below have been assessed.       Areas assessed by both nurses:   [x]   Head, Face, and Ears   [x]   Shoulders, Back, and Chest  [x]   Arms, Elbows, and Hands   [x]   Coccyx, Sacrum, and Ischum  [x]   Legs, Feet, and Heels        Does the Patient have Skin Breakdown?  incision lower ABD, x3 LEISA        Shiv Prevention initiated:  RAVINDRA   Wound Care Orders initiated:  NA      WOC nurse consulted for Pressure Injury (Stage 3,4, Unstageable, DTI, NWPT, and Complex wounds):  NA      Nurse 1 eSignature: Electronically signed by Siomara Callahan RN on 7/24/24 at 7:32 PM EDT    Nurse 2 eSignature: Electronically signed by Brigid Swift RN on 7/24/24 at 11:33 PM EDT

## 2024-07-24 NOTE — OP NOTE
Operative Note      Patient: Glenn Ortiz  YOB: 1935  MRN: [id number]    Date of Procedure: 7/24/2024    Pre-Op Diagnosis Codes:     * Stress incontinence [N39.3]        AUS cuff erosion        Infected AUS    Post-Op Diagnosis: Same       Procedure(s):  Explantation of artificial urinary sphincter - CPT 12753  Primary anastomotic urethroplasty - CPT 43971   Flexible cystoscopy - CPT 58572    Surgeon(s):  Surekha Bedolla MD    Assistant:   Surgical Assistant: Angelica Jenkins Jordan    Anesthesia: General    Estimated Blood Loss (mL): Minimal    Complications: None    Specimens:   ID Type Source Tests Collected by Time Destination   1 : urinary cuff, wound swab Genital Penis CULTURE, FUNGUS, CULTURE, SURGICAL Surekha Bedolla MD 7/24/2024 1511    2 : urinary reservoir, wound swab Genital Penis CULTURE, FUNGUS, CULTURE, SURGICAL Surekha Bedolla MD 7/24/2024 1510    3 : urinary pump, wound swab Genital Penis CULTURE, FUNGUS, CULTURE, SURGICAL Surekha Bedolla MD 7/24/2024 1523        Implants:  * No implants in log *      Drains:   Closed/Suction Drain Distal Abdomen Bulb (Active)       Closed/Suction Drain Other (Comment) Bulb (Active)       Urinary Catheter 07/24/24 2 Way (Active)       Findings:  Infection Present At Time Of Surgery (PATOS) (choose all levels that have infection present):  - Organ Space infection (below fascia) present as evidenced by fluid consistent with infection  Other Findings: Right ventral AUS cuff erosion involving approximately 30 to 40% of the urethra-primary anastomotic urethroplasty performed-Trejo placed - All components of artificial urinary sphincter removed    Detailed Description of Procedure:   The patient was brought to the operating room and antibiotics and antifungals had been given.  The patient was prepped with a 10-minute Hibiclens and ChloraPrep scrub.  He was then draped and we changed our gloves.       I started with flexible cystoscopy. This  revealed a ventral erosion, moreso to the right. Beyond this, the proximal urethra was found. I scoped through here and into the bladder.  No masses, tumors or lesions were seen.  The scope was removed and a 12Fr johnson was placed with 10cc sterile water in the balloon. There was return of urine.      I then made a longtitudinal incision at the perineal area at the previous incision. There was a lot of scarring which distorted the anatomy.  I deepened through Colles' fascia and went to see who AUS cuff. It was very difficult to identify from the erosion and infection. The tab was cut and the cuff was removed and discarded.  Murky looking fluid was encountered and a wound culture was sent.  Exam revealed a right ventral AUS cuff erosion involving approximately 30 to 40% of the urethra.  It was about 1 cm in length but the proximal urethra here appeared necrotic appearing and a second hole was appreciated. I resected this and freshened this.  I then mobilized the urethra both proximally and distally to provide some laxity to complete the urethral repair. Given these findings I thought it would be best to perform a primary anastomotic urethroplasty.  I obtained 3-0 PDS and placed 5 interrupted sutures.  These were then tied and it appeared watertight.  I injected Uro-Jet and there was no leak identified.  An 18 Jordanian Johnson was then placed into the bladder after we remove the 12 Jordanian Johnson.       I then made a incision on the lower quadrant through the previous scar.  I dissected down to the AUS reservoir.  Some murky fluid was encountered and a wound culture was sent.  I then shotted off the tubing and dissected down to the reservoir.  It was emptied and removed very meticulously from the intraperitoneal space.  I placed a red rubber catheter into the space and irrigated copiously with Irrisept. I then dissected out the pump from the right hemiscrotum. Wound culture was sent.  This was removed and discarded.  The

## 2024-07-24 NOTE — PROGRESS NOTES
Patient arrived to PACU bay 5, phase one initiated. Placed on bedside monitor, VSS. Report obtained from OR RN and anesthesia. Patient on O2 via non-rebreather at 15L. Assessment WNL. Warm blankets applied. Side rails in place, will monitor patient closely.

## 2024-07-25 LAB
ANION GAP SERPL CALCULATED.3IONS-SCNC: 11 MMOL/L (ref 3–16)
BASOPHILS # BLD: 0 K/UL (ref 0–0.2)
BASOPHILS NFR BLD: 0.3 %
BUN SERPL-MCNC: 15 MG/DL (ref 7–20)
CALCIUM SERPL-MCNC: 8.3 MG/DL (ref 8.3–10.6)
CHLORIDE SERPL-SCNC: 97 MMOL/L (ref 99–110)
CO2 SERPL-SCNC: 23 MMOL/L (ref 21–32)
CREAT SERPL-MCNC: 0.9 MG/DL (ref 0.8–1.3)
DEPRECATED RDW RBC AUTO: 12.7 % (ref 12.4–15.4)
EOSINOPHIL # BLD: 0 K/UL (ref 0–0.6)
EOSINOPHIL NFR BLD: 0.1 %
GFR SERPLBLD CREATININE-BSD FMLA CKD-EPI: 81 ML/MIN/{1.73_M2}
GLUCOSE SERPL-MCNC: 133 MG/DL (ref 70–99)
HCT VFR BLD AUTO: 35.4 % (ref 40.5–52.5)
HGB BLD-MCNC: 12.2 G/DL (ref 13.5–17.5)
LOEFFLER MB STN SPEC: NORMAL
LYMPHOCYTES # BLD: 1 K/UL (ref 1–5.1)
LYMPHOCYTES NFR BLD: 10.2 %
MCH RBC QN AUTO: 30.5 PG (ref 26–34)
MCHC RBC AUTO-ENTMCNC: 34.5 G/DL (ref 31–36)
MCV RBC AUTO: 88.3 FL (ref 80–100)
MONOCYTES # BLD: 0.4 K/UL (ref 0–1.3)
MONOCYTES NFR BLD: 4 %
NEUTROPHILS # BLD: 8.1 K/UL (ref 1.7–7.7)
NEUTROPHILS NFR BLD: 85.4 %
PLATELET # BLD AUTO: 406 K/UL (ref 135–450)
PMV BLD AUTO: 7.5 FL (ref 5–10.5)
POTASSIUM SERPL-SCNC: 4.3 MMOL/L (ref 3.5–5.1)
RBC # BLD AUTO: 4.01 M/UL (ref 4.2–5.9)
SODIUM SERPL-SCNC: 131 MMOL/L (ref 136–145)
WBC # BLD AUTO: 9.5 K/UL (ref 4–11)

## 2024-07-25 PROCEDURE — 97530 THERAPEUTIC ACTIVITIES: CPT

## 2024-07-25 PROCEDURE — 97162 PT EVAL MOD COMPLEX 30 MIN: CPT

## 2024-07-25 PROCEDURE — 6370000000 HC RX 637 (ALT 250 FOR IP): Performed by: UROLOGY

## 2024-07-25 PROCEDURE — 97116 GAIT TRAINING THERAPY: CPT

## 2024-07-25 PROCEDURE — 99222 1ST HOSP IP/OBS MODERATE 55: CPT | Performed by: INTERNAL MEDICINE

## 2024-07-25 PROCEDURE — 2580000003 HC RX 258: Performed by: UROLOGY

## 2024-07-25 PROCEDURE — 80048 BASIC METABOLIC PNL TOTAL CA: CPT

## 2024-07-25 PROCEDURE — 6360000002 HC RX W HCPCS: Performed by: UROLOGY

## 2024-07-25 PROCEDURE — 97165 OT EVAL LOW COMPLEX 30 MIN: CPT

## 2024-07-25 PROCEDURE — 85025 COMPLETE CBC W/AUTO DIFF WBC: CPT

## 2024-07-25 PROCEDURE — 36415 COLL VENOUS BLD VENIPUNCTURE: CPT

## 2024-07-25 PROCEDURE — 1200000000 HC SEMI PRIVATE

## 2024-07-25 RX ADMIN — ACETAMINOPHEN 650 MG: 325 TABLET ORAL at 12:29

## 2024-07-25 RX ADMIN — FAMOTIDINE 10 MG: 20 TABLET, FILM COATED ORAL at 08:25

## 2024-07-25 RX ADMIN — FLUCONAZOLE IN SODIUM CHLORIDE 400 MG: 2 INJECTION, SOLUTION INTRAVENOUS at 12:32

## 2024-07-25 RX ADMIN — ENOXAPARIN SODIUM 40 MG: 100 INJECTION SUBCUTANEOUS at 08:25

## 2024-07-25 RX ADMIN — KETOROLAC TROMETHAMINE 15 MG: 30 INJECTION, SOLUTION INTRAMUSCULAR at 04:41

## 2024-07-25 RX ADMIN — PIPERACILLIN AND TAZOBACTAM 3375 MG: 3; .375 INJECTION, POWDER, LYOPHILIZED, FOR SOLUTION INTRAVENOUS at 15:11

## 2024-07-25 RX ADMIN — Medication 5 MG: at 20:09

## 2024-07-25 RX ADMIN — PIPERACILLIN AND TAZOBACTAM 3375 MG: 3; .375 INJECTION, POWDER, LYOPHILIZED, FOR SOLUTION INTRAVENOUS at 04:40

## 2024-07-25 RX ADMIN — KETOROLAC TROMETHAMINE 15 MG: 30 INJECTION, SOLUTION INTRAMUSCULAR at 15:57

## 2024-07-25 RX ADMIN — PIPERACILLIN AND TAZOBACTAM 3375 MG: 3; .375 INJECTION, POWDER, LYOPHILIZED, FOR SOLUTION INTRAVENOUS at 23:22

## 2024-07-25 RX ADMIN — KETOROLAC TROMETHAMINE 15 MG: 30 INJECTION, SOLUTION INTRAMUSCULAR at 10:13

## 2024-07-25 RX ADMIN — AMLODIPINE BESYLATE 5 MG: 5 TABLET ORAL at 08:25

## 2024-07-25 RX ADMIN — ACETAMINOPHEN 650 MG: 325 TABLET ORAL at 18:21

## 2024-07-25 RX ADMIN — SODIUM CHLORIDE: 9 INJECTION, SOLUTION INTRAVENOUS at 15:09

## 2024-07-25 ASSESSMENT — ENCOUNTER SYMPTOMS
CONSTIPATION: 0
TROUBLE SWALLOWING: 0
SHORTNESS OF BREATH: 0
CHEST TIGHTNESS: 0
SINUS PAIN: 0
DIARRHEA: 0
SINUS PRESSURE: 0
BACK PAIN: 0
COLOR CHANGE: 0
VOMITING: 0
VOICE CHANGE: 0
NAUSEA: 0
SORE THROAT: 0
COUGH: 0
ABDOMINAL PAIN: 0
WHEEZING: 0

## 2024-07-25 ASSESSMENT — PAIN SCALES - GENERAL
PAINLEVEL_OUTOF10: 2
PAINLEVEL_OUTOF10: 3
PAINLEVEL_OUTOF10: 2
PAINLEVEL_OUTOF10: 0

## 2024-07-25 ASSESSMENT — PAIN DESCRIPTION - DESCRIPTORS: DESCRIPTORS: ACHING

## 2024-07-25 ASSESSMENT — PAIN DESCRIPTION - LOCATION: LOCATION: INCISION

## 2024-07-25 NOTE — PROGRESS NOTES
Pt Name: Glenn Ortiz  Medical Record Number: [id number]  Date of Birth 1935   Today's Date: 7/25/2024      Subjective:  Awake in bed, feeling well overall. Johnson catheter with yellow output    ROS: Constitutional: No fever    Vitals:  Vitals:    07/24/24 2002 07/24/24 2258 07/25/24 0437 07/25/24 0715   BP: 133/79 (!) 141/85 (!) 154/85 (!) 167/83   Pulse: 91 89 66 75   Resp: 16 18 18 18   Temp: 97.4 °F (36.3 °C) 97.7 °F (36.5 °C) 97.8 °F (36.6 °C) 97.7 °F (36.5 °C)   TempSrc: Axillary Oral Oral Oral   SpO2: 95% 96% 95% 96%   Weight:       Height:         I/O last 3 completed shifts:  In: 1200 [I.V.:900; IV Piggyback:300]  Out: 1530 [Urine:1500; Drains:30]    Exam:  General: Awake, oriented, no acute distress  Respiratory: Nonlabored breathing  Abdomen: Soft, non-tender, non-distended, no masses  : johnson catheter intact, perineal incision C/D/I  Skin: Skin color, texture, turgor normal, no rashes or lesions  Neurologic: no gross deficits    CURRENT MEDICATIONS   Scheduled Meds:   amLODIPine  5 mg Oral Daily    famotidine  10 mg Oral Daily    melatonin  5 mg Oral Nightly    polyethylene glycol  17 g Oral Daily    sodium chloride flush  5-40 mL IntraVENous 2 times per day    enoxaparin  40 mg SubCUTAneous Daily    piperacillin-tazobactam  3,375 mg IntraVENous Q8H    fluconazole  400 mg IntraVENous Q24H    acetaminophen  650 mg Oral 4 times per day    ketorolac  15 mg IntraVENous Q6H    sodium chloride flush  5-40 mL IntraVENous 2 times per day    ondansetron  4 mg IntraVENous Once     Continuous Infusions:   sodium chloride      sodium chloride       PRN Meds:.sodium chloride flush, sodium chloride, potassium chloride **OR** potassium alternative oral replacement **OR** potassium chloride, magnesium sulfate, ondansetron **OR** ondansetron, bisacodyl, HYDROmorphone, oxyCODONE, naloxone 0.4 mg in 10 mL sodium chloride syringe, sodium chloride flush, sodium chloride, diphenhydrAMINE    LABS     Recent Labs

## 2024-07-25 NOTE — PROGRESS NOTES
Physical Therapy  Facility/Department: [facility] C3 TELE/MED SURG/ONC  Physical Therapy Initial Assessment    Name: Glenn Ortiz  : 1935  MRN: [id number]  Date of Service: 2024    Discharge Recommendations:  24 hour supervision or assist, Home with Home health PT   PT Equipment Recommendations  Equipment Needed: No      Patient Diagnosis(es): The encounter diagnosis was Stress incontinence.  Past Medical History:  has a past medical history of Achalasia, Acute UTI, Basal cell cancer, Cataract, Colon polyps, Dilated aortic root (HCC), First degree AV block, Gastroesophageal reflux disease without esophagitis, GERD (gastroesophageal reflux disease), H/O degenerative disc disease, Hiatal hernia, Hyperlipidemia, Hypertension, Lumbar disc disease, Lumbar spinal stenosis, Lumbar spondylosis, Prostate cancer (HCC), Renal cancer (HCC), Sleep disturbance, Stress incontinence, Thoracic aneurysm, and Urinary retention.  Past Surgical History:  has a past surgical history that includes Prostate surgery (1997); back surgery (); knee surgery; Colonoscopy (2012); partial nephrectomy (); Cataract removal with implant (2013); Mohs surgery (, 10/18/2018); Total knee arthroplasty (Left, 2015); Cataract removal with implant (Left, 2016); Eye surgery; Upper gastrointestinal endoscopy (2020); Urethra surgery (2021); and Urological Surgery (N/A, 2024).    Assessment   Body Structures, Functions, Activity Limitations Requiring Skilled Therapeutic Intervention: Decreased functional mobility ;Decreased balance;Decreased endurance  Assessment: Pt seen this session for PT/OT initial evaluation. PT admitted to [facility] d/t infection of artificial urinary sphincter. Pt pmhx includes HTN and hx of prostate cancer. Pt reports being previously independent with ambulation and transferring without an AD prior to admission.  Pt completes bed mobility with SBA and HOB elevated. Pt completes

## 2024-07-25 NOTE — PROGRESS NOTES
Occupational Therapy  Facility/Department: [facility] C3 TELE/MED SURG/ONC  Occupational Therapy Initial Assessment/Discharge Summary    Name: Glenn Ortiz  : 1935  MRN: [id number]  Date of Service: 2024    Discharge Recommendations:  24 hour supervision or assist          Patient Diagnosis(es): The encounter diagnosis was Stress incontinence.  Past Medical History:  has a past medical history of Achalasia, Acute UTI, Basal cell cancer, Cataract, Colon polyps, Dilated aortic root (HCC), First degree AV block, Gastroesophageal reflux disease without esophagitis, GERD (gastroesophageal reflux disease), H/O degenerative disc disease, Hiatal hernia, Hyperlipidemia, Hypertension, Lumbar disc disease, Lumbar spinal stenosis, Lumbar spondylosis, Prostate cancer (HCC), Renal cancer (HCC), Sleep disturbance, Stress incontinence, Thoracic aneurysm, and Urinary retention.  Past Surgical History:  has a past surgical history that includes Prostate surgery (1997); back surgery (); knee surgery; Colonoscopy (2012); partial nephrectomy (); Cataract removal with implant (2013); Mohs surgery (, 10/18/2018); Total knee arthroplasty (Left, 2015); Cataract removal with implant (Left, 2016); Eye surgery; Upper gastrointestinal endoscopy (2020); Urethra surgery (2021); and Urological Surgery (N/A, 2024).           Assessment   Performance deficits / Impairments: Decreased balance  Assessment: Pt seen s/p removal of urinary sphincter. PTA pt IND with ADLs and IADLs. Today, pt requires SBA progressing to SPV for grooming tasks at sink, stands for >7 min. Pt requires SBA for fxl transfers and min fading to CGA for ambulation without AD in hallway and to/from bathroom. Pt requires verbal cues for pacing during ambulation. Pt is functioning near baseline for performance of ADLs and has no acute OT concerns at this time. Recommending initial 24 hr SPV at d/c   Decision Making:

## 2024-07-25 NOTE — PROGRESS NOTES
Pt assessment completed and charted. VSS. Pt a/o. Pt is requesting to ambulate. MD notified and PT/OT added to care. Educated pt on importance of safety and notifying staff when getting out of bed. Pt verbalized understanding of education. Pt reports some pain at  incisions. Pain medication given per MAR. Bed in lowest position and wheels locked. Call light within reach. Bedside table within reach. Non-skid socks in place. Pt denies any other needs at this time.  Pt calls out appropriately.

## 2024-07-25 NOTE — CARE COORDINATION
Case Management Assessment  Initial Evaluation    Date/Time of Evaluation: 7/25/2024 2:07 PM  Assessment Completed by: TORITO Morfin    If patient is discharged prior to next notation, then this note serves as note for discharge by case management.    Patient Name: Glenn Ortiz                   YOB: 1935  Diagnosis: Stress incontinence [N39.3]  Status post implantation of artificial urinary sphincter [Z96.0]                   Date / Time: 7/24/2024 12:59 PM    Patient Admission Status: Inpatient   Readmission Risk (Low < 19, Mod (19-27), High > 27): Readmission Risk Score: 6.6    Current PCP: Mikhail Gallardo, DO  PCP verified by CM? Yes    Chart Reviewed: Yes      History Provided by: Patient  Patient Orientation: Alert and Oriented    Patient Cognition: Alert    Hospitalization in the last 30 days (Readmission):  No    If yes, Readmission Assessment in CM Navigator will be completed.    Advance Directives:      Code Status: Full Code   Patient's Primary Decision Maker is: Legal Next of Kin    Primary Decision Maker: Marianna Ortiz - Spouse - [phone]    Secondary Decision Maker: Gil Ortiz - Unknown - [phone]    Discharge Planning:    Patient lives with: Spouse/Significant Other Type of Home: Independent Living  Primary Care Giver: Self  Patient Support Systems include: Spouse/Significant Other   Current Financial resources: Medicare  Current community resources: Housing (lives in [facility] on [facility])  Current services prior to admission: None            Current DME:              Type of Home Care services:  None    ADLS  Prior functional level: Independent in ADLs/IADLs  Current functional level: Independent in ADLs/IADLs    PT AM-PAC:   /24  OT AM-PAC: 20 /24    Family can provide assistance at DC: Yes  Would you like Case Management to discuss the discharge plan with any other family members/significant others, and if so, who? No  Plans to Return to  [Z96.0]    IF APPLICABLE: The Patient and/or patient representative Glenn and his family were provided with a choice of provider and agrees with the discharge plan. Freedom of choice list with basic dialogue that supports the patient's individualized plan of care/goals and shares the quality data associated with the providers was provided to: Patient   Patient Representative Name:       The Patient and/or Patient Representative Agree with the Discharge Plan?      TORITO Morfin  Case Management Department

## 2024-07-25 NOTE — CONSULTS
mg, IntraVENous, Q6H PRN  enoxaparin (LOVENOX) injection 40 mg, 40 mg, SubCUTAneous, Daily  bisacodyl (DULCOLAX) EC tablet 5 mg, 5 mg, Oral, Daily PRN  piperacillin-tazobactam (ZOSYN) 3,375 mg in sodium chloride 0.9 % 50 mL IVPB (mini-bag), 3,375 mg, IntraVENous, Q8H  fluconazole (DIFLUCAN) in 0.9 % sodium chloride IVPB 400 mg, 400 mg, IntraVENous, Q24H  acetaminophen (TYLENOL) tablet 650 mg, 650 mg, Oral, 4 times per day  HYDROmorphone (DILAUDID) injection 0.25 mg, 0.25 mg, IntraVENous, Q4H PRN  oxyCODONE (ROXICODONE) immediate release tablet 5 mg, 5 mg, Oral, Q4H PRN  ketorolac (TORADOL) injection 15 mg, 15 mg, IntraVENous, Q6H  naloxone 0.4 mg in 10 mL sodium chloride syringe, , IntraVENous, PRN  sodium chloride flush 0.9 % injection 5-40 mL, 5-40 mL, IntraVENous, 2 times per day  sodium chloride flush 0.9 % injection 5-40 mL, 5-40 mL, IntraVENous, PRN  0.9 % sodium chloride infusion, , IntraVENous, PRN  ondansetron (ZOFRAN) injection 4 mg, 4 mg, IntraVENous, Once  diphenhydrAMINE (BENADRYL) injection 6.5 mg, 6.5 mg, IntraVENous, Once PRN      Allergies   Allergen Reactions    Meperidine Nausea And Vomiting and Other (See Comments)     Other reaction(s): Hypotension  Heart rate dropped      Lisinopril Other (See Comments)    Ace Inhibitors     Morphine Other (See Comments)     Low blood pressure          REVIEW OF SYSTEMS:    CONSTITUTIONAL:   per HPI   EYES:  negative for blurred vision, eye discharge, visual disturbance and icterus  HEENT:  negative for hearing loss, tinnitus, ear drainage, sinus pressure, nasal congestion, epistaxis and snoring  RESPIRATORY:  No cough, shortness of breath, hemoptysis  CARDIOVASCULAR:  negative for chest pain, palpitations  GASTROINTESTINAL:  negative for nausea, vomiting  GENITOURINARY:    per HPI   HEMATOLOGIC/LYMPHATIC:  negative for easy bruising, bleeding and lymphadenopathy  ALLERGIC/IMMUNOLOGIC:  negative for recurrent infections, angioedema, anaphylaxis and drug  for: \"CRP\"      Cultures:   7/20 UC 4000 cfu/ml E coli, no sensitivity testing     7/24 Multiple operative cultures in process, one with rare growth E coli, sensi pending; all stains negative for organisms    BC x2 NGTD       RADIOLOGY:  CT ap 7/20/24   Postoperative sequela from placement of artificial urinary sphincter without evidence of catheter discontinuity. There is a small fluid collection along the catheter in the lower abdominal wall of uncertain clinical significance.   Swelling of the right hemiscrotum.   Trejo catheter balloon in the bladder with moderate bladder distention.     Assessment:     Patient Active Problem List   Diagnosis    Hyperlipidemia    Thoracic aortic aneurysm without rupture (HCC)    Benign essential HTN    Gastroesophageal reflux disease without esophagitis    Achalasia    History of malignant neoplasm of kidney    Thoracic ascending aortic aneurysm (HCC)    Adenomatous polyp of colon    Acute blood loss anemia    Age-related nuclear cataract of left eye    Choroidal nevus, right eye    DDD (degenerative disc disease), lumbosacral    Esophageal stricture    Lumbar disc herniation    Orthostatic hypotension    Other biomechanical lesions of lumbar region    Post-laminectomy syndrome    Presence of intraocular lens    Retinal edema    Secondary cataract of left eye with vision obscured    Small bowel polyp    Lumbar spondylosis    Vitreomacular adhesion, left eye    Vitreous degeneration, left eye    Stress incontinence, male    First degree AV block    IFG (impaired fasting glucose)    Left upper lobe pulmonary nodule    Chronic right-sided low back pain with right-sided sciatica    Lumbar radiculopathy    Lumbar foraminal stenosis    PVD (peripheral vascular disease) ([facility])    Idiopathic peripheral neuropathy    RLS (restless legs syndrome)    Urge incontinence of urine    Resting tremor    Status post implantation of artificial urinary sphincter       Remote history of prostate

## 2024-07-25 NOTE — PLAN OF CARE
Problem: Safety - Adult  Goal: Free from fall injury  Outcome: Progressing  Flowsheets (Taken 7/25/2024 1116)  Free From Fall Injury:   Instruct family/caregiver on patient safety   Based on caregiver fall risk screen, instruct family/caregiver to ask for assistance with transferring infant if caregiver noted to have fall risk factors     Problem: ABCDS Injury Assessment  Goal: Absence of physical injury  Outcome: Progressing  Flowsheets (Taken 7/25/2024 1116)  Absence of Physical Injury: Implement safety measures based on patient assessment     Problem: Pain  Goal: Verbalizes/displays adequate comfort level or baseline comfort level  Outcome: Progressing  Flowsheets (Taken 7/25/2024 1116)  Verbalizes/displays adequate comfort level or baseline comfort level:   Encourage patient to monitor pain and request assistance   Assess pain using appropriate pain scale   Administer analgesics based on type and severity of pain and evaluate response   Implement non-pharmacological measures as appropriate and evaluate response

## 2024-07-25 NOTE — PROGRESS NOTES
Thought Content: Thought content normal.         Medications:   Medications:    amLODIPine  5 mg Oral Daily    famotidine  10 mg Oral Daily    melatonin  5 mg Oral Nightly    polyethylene glycol  17 g Oral Daily    sodium chloride flush  5-40 mL IntraVENous 2 times per day    enoxaparin  40 mg SubCUTAneous Daily    piperacillin-tazobactam  3,375 mg IntraVENous Q8H    fluconazole  400 mg IntraVENous Q24H    acetaminophen  650 mg Oral 4 times per day    ketorolac  15 mg IntraVENous Q6H    sodium chloride flush  5-40 mL IntraVENous 2 times per day    ondansetron  4 mg IntraVENous Once      Infusions:    sodium chloride      sodium chloride       PRN Meds: sodium chloride flush, 10 mL, PRN  sodium chloride, , PRN  potassium chloride, 40 mEq, PRN   Or  potassium alternative oral replacement, 40 mEq, PRN   Or  potassium chloride, 10 mEq, PRN  magnesium sulfate, 2,000 mg, PRN  ondansetron, 4 mg, Q8H PRN   Or  ondansetron, 4 mg, Q6H PRN  bisacodyl, 5 mg, Daily PRN  HYDROmorphone, 0.25 mg, Q4H PRN  oxyCODONE, 5 mg, Q4H PRN  naloxone 0.4 mg in 10 mL sodium chloride syringe, , PRN  sodium chloride flush, 5-40 mL, PRN  sodium chloride, , PRN  diphenhydrAMINE, 6.5 mg, Once PRN        Labs      Recent Results (from the past 24 hour(s))   Culture, Fungus    Collection Time: 07/24/24  3:10 PM    Specimen: Penis; Genital   Result Value Ref Range    Fungus Stain No Fungal elements seen    Culture, Surgical    Collection Time: 07/24/24  3:10 PM    Specimen: Penis; Genital   Result Value Ref Range    Gram Stain Result 4+ WBC's (Polymorphonuclear)  No organisms seen      Culture, Fungus    Collection Time: 07/24/24  3:11 PM    Specimen: Penis; Genital   Result Value Ref Range    Fungus Stain No Fungal elements seen    Culture, Surgical    Collection Time: 07/24/24  3:11 PM    Specimen: Penis; Genital   Result Value Ref Range    Gram Stain Result 3+ WBC's (Polymorphonuclear)  No organisms seen      Culture, Fungus    Collection Time:  07/24/24  3:23 PM    Specimen: Penis; Genital   Result Value Ref Range    Fungus Stain No Fungal elements seen    Culture, Surgical    Collection Time: 07/24/24  3:23 PM    Specimen: Penis; Genital   Result Value Ref Range    Gram Stain Result 2+ WBC's (Polymorphonuclear)  No organisms seen      Basic Metabolic Panel w/ Reflex to MG    Collection Time: 07/25/24  4:30 AM   Result Value Ref Range    Sodium 131 (L) 136 - 145 mmol/L    Potassium reflex Magnesium 4.3 3.5 - 5.1 mmol/L    Chloride 97 (L) 99 - 110 mmol/L    CO2 23 21 - 32 mmol/L    Anion Gap 11 3 - 16    Glucose 133 (H) 70 - 99 mg/dL    BUN 15 7 - 20 mg/dL    Creatinine 0.9 0.8 - 1.3 mg/dL    Est, Glom Filt Rate 81 >60    Calcium 8.3 8.3 - 10.6 mg/dL   CBC with Auto Differential    Collection Time: 07/25/24  4:30 AM   Result Value Ref Range    WBC 9.5 4.0 - 11.0 K/uL    RBC 4.01 (L) 4.20 - 5.90 M/uL    Hemoglobin 12.2 (L) 13.5 - 17.5 g/dL    Hematocrit 35.4 (L) 40.5 - 52.5 %    MCV 88.3 80.0 - 100.0 fL    MCH 30.5 26.0 - 34.0 pg    MCHC 34.5 31.0 - 36.0 g/dL    RDW 12.7 12.4 - 15.4 %    Platelets 406 135 - 450 K/uL    MPV 7.5 5.0 - 10.5 fL    Neutrophils % 85.4 %    Lymphocytes % 10.2 %    Monocytes % 4.0 %    Eosinophils % 0.1 %    Basophils % 0.3 %    Neutrophils Absolute 8.1 (H) 1.7 - 7.7 K/uL    Lymphocytes Absolute 1.0 1.0 - 5.1 K/uL    Monocytes Absolute 0.4 0.0 - 1.3 K/uL    Eosinophils Absolute 0.0 0.0 - 0.6 K/uL    Basophils Absolute 0.0 0.0 - 0.2 K/uL        Imaging/Diagnostics Last 24 Hours   No results found.    Comment: Please note this report has been produced using speech recognition software and may contain errors related to that system including errors in grammar, punctuation, and spelling, as well as words and phrases that may be inappropriate. If there are any questions or concerns please feel free to contact the dictating provider for clarification.     Electronically signed by Geoff Tucker MD on 7/25/2024 at 12:27 PM

## 2024-07-26 VITALS
SYSTOLIC BLOOD PRESSURE: 116 MMHG | HEIGHT: 73 IN | BODY MASS INDEX: 27.3 KG/M2 | OXYGEN SATURATION: 97 % | WEIGHT: 206 LBS | TEMPERATURE: 97.5 F | DIASTOLIC BLOOD PRESSURE: 70 MMHG | HEART RATE: 66 BPM | RESPIRATION RATE: 16 BRPM

## 2024-07-26 LAB
BASOPHILS # BLD: 0.1 K/UL (ref 0–0.2)
BASOPHILS NFR BLD: 0.4 %
DEPRECATED RDW RBC AUTO: 13 % (ref 12.4–15.4)
EOSINOPHIL # BLD: 0.1 K/UL (ref 0–0.6)
EOSINOPHIL NFR BLD: 0.6 %
HCT VFR BLD AUTO: 37.1 % (ref 40.5–52.5)
HGB BLD-MCNC: 13.1 G/DL (ref 13.5–17.5)
LYMPHOCYTES # BLD: 2 K/UL (ref 1–5.1)
LYMPHOCYTES NFR BLD: 16 %
MCH RBC QN AUTO: 31.3 PG (ref 26–34)
MCHC RBC AUTO-ENTMCNC: 35.3 G/DL (ref 31–36)
MCV RBC AUTO: 88.7 FL (ref 80–100)
MONOCYTES # BLD: 1 K/UL (ref 0–1.3)
MONOCYTES NFR BLD: 8.2 %
NEUTROPHILS # BLD: 9.5 K/UL (ref 1.7–7.7)
NEUTROPHILS NFR BLD: 74.8 %
PLATELET # BLD AUTO: 407 K/UL (ref 135–450)
PMV BLD AUTO: 7.8 FL (ref 5–10.5)
RBC # BLD AUTO: 4.18 M/UL (ref 4.2–5.9)
WBC # BLD AUTO: 12.7 K/UL (ref 4–11)

## 2024-07-26 PROCEDURE — 36415 COLL VENOUS BLD VENIPUNCTURE: CPT

## 2024-07-26 PROCEDURE — 2580000003 HC RX 258: Performed by: UROLOGY

## 2024-07-26 PROCEDURE — 85025 COMPLETE CBC W/AUTO DIFF WBC: CPT

## 2024-07-26 PROCEDURE — 99232 SBSQ HOSP IP/OBS MODERATE 35: CPT | Performed by: INTERNAL MEDICINE

## 2024-07-26 PROCEDURE — 6370000000 HC RX 637 (ALT 250 FOR IP): Performed by: UROLOGY

## 2024-07-26 PROCEDURE — 97116 GAIT TRAINING THERAPY: CPT

## 2024-07-26 PROCEDURE — 6360000002 HC RX W HCPCS: Performed by: UROLOGY

## 2024-07-26 PROCEDURE — 97110 THERAPEUTIC EXERCISES: CPT

## 2024-07-26 RX ORDER — FLUCONAZOLE 200 MG/1
200 TABLET ORAL DAILY
Qty: 10 TABLET | Refills: 0 | Status: SHIPPED | OUTPATIENT
Start: 2024-07-26 | End: 2024-08-05

## 2024-07-26 RX ORDER — AMOXICILLIN AND CLAVULANATE POTASSIUM 875; 125 MG/1; MG/1
1 TABLET, FILM COATED ORAL 2 TIMES DAILY
Qty: 20 TABLET | Refills: 0 | Status: SHIPPED | OUTPATIENT
Start: 2024-07-26 | End: 2024-08-05

## 2024-07-26 RX ADMIN — ENOXAPARIN SODIUM 40 MG: 100 INJECTION SUBCUTANEOUS at 08:33

## 2024-07-26 RX ADMIN — ACETAMINOPHEN 650 MG: 325 TABLET ORAL at 06:52

## 2024-07-26 RX ADMIN — POLYETHYLENE GLYCOL 3350 17 G: 17 POWDER, FOR SOLUTION ORAL at 08:34

## 2024-07-26 RX ADMIN — PIPERACILLIN AND TAZOBACTAM 3375 MG: 3; .375 INJECTION, POWDER, LYOPHILIZED, FOR SOLUTION INTRAVENOUS at 06:54

## 2024-07-26 RX ADMIN — FAMOTIDINE 10 MG: 20 TABLET, FILM COATED ORAL at 08:34

## 2024-07-26 RX ADMIN — ACETAMINOPHEN 650 MG: 325 TABLET ORAL at 12:51

## 2024-07-26 RX ADMIN — AMLODIPINE BESYLATE 5 MG: 5 TABLET ORAL at 08:34

## 2024-07-26 ASSESSMENT — ENCOUNTER SYMPTOMS
NAUSEA: 0
WHEEZING: 0
CONSTIPATION: 0
DIARRHEA: 0
SORE THROAT: 0
SHORTNESS OF BREATH: 0
BACK PAIN: 0
CHEST TIGHTNESS: 0
ABDOMINAL PAIN: 0
SINUS PRESSURE: 0
VOICE CHANGE: 0
COLOR CHANGE: 0
SINUS PAIN: 0
VOMITING: 0
COUGH: 0
TROUBLE SWALLOWING: 0

## 2024-07-26 ASSESSMENT — PAIN SCALES - GENERAL: PAINLEVEL_OUTOF10: 3

## 2024-07-26 NOTE — PROGRESS NOTES
Pt Name: Glenn Ortiz  Medical Record Number: [id number]  Date of Birth 1935   Today's Date: 7/26/2024      Subjective:  Awake in chair. Saw him ambulating hallway with PT. He is eager to go home. + Flatulence, good appetite.     ROS: Constitutional: No fever    Vitals:  Vitals:    07/25/24 1324 07/25/24 2002 07/25/24 2323 07/26/24 0832   BP: (!) 169/87 (!) 147/81 (!) 161/8 136/71   Pulse: 70 62 61 76   Resp:  16 16 16   Temp:  97.8 °F (36.6 °C) 97.8 °F (36.6 °C) 97.6 °F (36.4 °C)   TempSrc:  Oral Oral Oral   SpO2: 94%  96% 98%   Weight:       Height:         I/O last 3 completed shifts:  In: 1659.3 [P.O.:840; I.V.:475.1; IV Piggyback:344.2]  Out: 5190 [Urine:5150; Drains:40]    Exam:  General: Awake, oriented, no acute distress  Respiratory: Nonlabored breathing  Abdomen: Soft, non-tender, mildly-distended, no masses  : johnson catheter intact with yellow output. LEISA drains with minimal output.   Skin: Skin color, texture, turgor normal, no rashes or lesions  Neurologic: no gross deficits    CURRENT MEDICATIONS   Scheduled Meds:   amLODIPine  5 mg Oral Daily    famotidine  10 mg Oral Daily    melatonin  5 mg Oral Nightly    polyethylene glycol  17 g Oral Daily    sodium chloride flush  5-40 mL IntraVENous 2 times per day    enoxaparin  40 mg SubCUTAneous Daily    piperacillin-tazobactam  3,375 mg IntraVENous Q8H    fluconazole  400 mg IntraVENous Q24H    acetaminophen  650 mg Oral 4 times per day    sodium chloride flush  5-40 mL IntraVENous 2 times per day    ondansetron  4 mg IntraVENous Once     Continuous Infusions:   sodium chloride Stopped (07/25/24 1509)    sodium chloride       PRN Meds:.sodium chloride flush, sodium chloride, potassium chloride **OR** potassium alternative oral replacement **OR** potassium chloride, magnesium sulfate, ondansetron **OR** ondansetron, bisacodyl, HYDROmorphone, oxyCODONE, naloxone 0.4 mg in 10 mL sodium chloride syringe, sodium chloride flush, sodium chloride    LABS      Recent Labs     07/25/24  0430 07/26/24  0437   WBC 9.5 12.7*   HGB 12.2* 13.1*   HCT 35.4* 37.1*    407   *  --    K 4.3  --    CL 97*  --    CO2 23  --    BUN 15  --    CREATININE 0.9  --    CALCIUM 8.3  --        ASSESSMENT   POD # 2  S/p explantation of artificial urinary sphincter, urethroplasty and cystoscopy with Dr. Bedolla on 7/24/24 for AUS cuff erosion and infected AIS  Surgical culture - E. Coli - Sensitivity pending (ID Following)  PLAN   Leave indwelling catheter in place for 4 weeks after VCUG to ensure no leak present. He will f/u outpatient with Dr. Wiley at that time. Our office will call to arrange VCUG.  Sensitivity pending on culture, continue antibiotics. Will defer appropriate discharge timing to ID.      Jennifer Mccormick, TAMAR - CNP 7/26/2024 9:47 AM

## 2024-07-26 NOTE — FLOWSHEET NOTE
Patient with discharge orders. Pt IV removed. Patient LEISA drains removed and occlusive dressings in place. Patient, wife and son given paperwork . Reviewed paperwork and educated on johnson bag and leg bag teaching for patient. Patient and family given supplies needed. Patient and family with opportunity for teach back on johnson care. Patient to discharge home with spouse. Patient family collected belongings and picked up prescriptions from outpatient pharmacy. Patient left by wheelchair to main entrance. Discharge complete

## 2024-07-26 NOTE — PLAN OF CARE
Problem: Discharge Planning  Goal: Discharge to home or other facility with appropriate resources  Outcome: Progressing     Problem: Safety - Adult  Goal: Free from fall injury  7/26/2024 0052 by Abigail Fisher RN  Outcome: Progressing  7/25/2024 1116 by Darby Zavala RN  Outcome: Progressing  Flowsheets (Taken 7/25/2024 1116)  Free From Fall Injury:   Instruct family/caregiver on patient safety   Based on caregiver fall risk screen, instruct family/caregiver to ask for assistance with transferring infant if caregiver noted to have fall risk factors     Problem: ABCDS Injury Assessment  Goal: Absence of physical injury  7/26/2024 0052 by Abigail Fisher RN  Outcome: Progressing  7/25/2024 1116 by Darby Zavala RN  Outcome: Progressing  Flowsheets (Taken 7/25/2024 1116)  Absence of Physical Injury: Implement safety measures based on patient assessment     Problem: Pain  Goal: Verbalizes/displays adequate comfort level or baseline comfort level  7/25/2024 1116 by Darby Zavala RN  Outcome: Progressing  Flowsheets (Taken 7/25/2024 1116)  Verbalizes/displays adequate comfort level or baseline comfort level:   Encourage patient to monitor pain and request assistance   Assess pain using appropriate pain scale   Administer analgesics based on type and severity of pain and evaluate response   Implement non-pharmacological measures as appropriate and evaluate response

## 2024-07-26 NOTE — PROGRESS NOTES
V2.0  [facility] [place]      Name:  Glenn Ortiz /Age/Sex: 1935  (89 y.o. male)   MRN & CSN:  [id number] & [id number] Encounter Date/Time: 2024 12:27 PM EDT    Location:  [facility] PCP: Mikhail Gallardo DO       Hospital Day: 3    Assessment and Plan:   Glenn Ortiz is a 89 y.o. male who presents with Status post implantation of artificial urinary sphincter      Plan:    Infection of artificial urinary sphincter  Patient presented for removal with urology on   IV antibiotics: Zosyn plus Diflucan  Infectious disease consult  Surgical culture growing pansensitive E. coli  Pain control     Hypertension  Blood pressure normal with  To continue amlodipine     GERD  Continue pepcid       Diet ADULT DIET; Regular   DVT Prophylaxis [x] Lovenox, []  Heparin, [] SCDs, [] Ambulation,    [] Eliquis, [] Xarelto  [] Coumadin [] other   Code Status Full Code   Disposition From: home  Expected Disposition: home  Estimated Date of Discharge: 1-2 days  Patient requires continued admission due to culture results and IV abx.  Would be appropriate for discharge to home on antibiotics at discretion of infectious disease at this time   Surrogate Decision Maker/ POA      Personally reviewed Lab Studies and Imaging     Discussed management of the case with urology who recommended inpatient postop care as well as antibiotics     EKG interpreted personally and results no acute ST changes    Subjective:     Chief Complaint: No chief complaint on file.       Patient with no acute complaints today.  His culture did come back with pansensitive E. coli.  Patient expresses desire to go home and I believe this would be appropriate at this time on antibiotic at discretion of infectious disease.    Review of Systems:    Review of Systems   Constitutional:  Negative for activity change, appetite change, chills, fatigue and fever.   HENT:  Negative for congestion, hearing loss, sinus pressure, sinus pain, sore  %    Basophils % 0.4 %    Neutrophils Absolute 9.5 (H) 1.7 - 7.7 K/uL    Lymphocytes Absolute 2.0 1.0 - 5.1 K/uL    Monocytes Absolute 1.0 0.0 - 1.3 K/uL    Eosinophils Absolute 0.1 0.0 - 0.6 K/uL    Basophils Absolute 0.1 0.0 - 0.2 K/uL        Imaging/Diagnostics Last 24 Hours   No results found.    Comment: Please note this report has been produced using speech recognition software and may contain errors related to that system including errors in grammar, punctuation, and spelling, as well as words and phrases that may be inappropriate. If there are any questions or concerns please feel free to contact the dictating provider for clarification.     Electronically signed by Geoff Tucker MD on 7/26/2024 at 12:28 PM

## 2024-07-26 NOTE — PROGRESS NOTES
Physical Therapy  Facility/Department: [facility] C3 TELE/MED SURG/ONC  Daily Treatment Note/DC summary   NAME: Glenn Ortiz  : 1935  MRN: [id number]    Date of Service: 2024    Discharge Recommendations:  Home with assist PRN   PT Equipment Recommendations  Equipment Needed: No    Patient Diagnosis(es): The encounter diagnosis was Stress incontinence.    Assessment   Assessment: Pt tolerated activity well. Pt now able to demonstrate Mod I for bed mobility and supervision for ambulation due to lines. Pt reports feeling well and has no mobility concerns. Goals met this session. Will DC from acute PT  Activity Tolerance: Patient tolerated treatment well  Equipment Needed: No     Plan    Physical Therapy Plan  General Plan: 3-5 times per week  Current Treatment Recommendations: Transfer training;Strengthening;Balance training;Functional mobility training;Endurance training;Gait training;Cognitive/Perceptual training     Restrictions  Restrictions/Precautions  Restrictions/Precautions: Up as Tolerated  Position Activity Restriction  Other position/activity restrictions: LEISA drain, IV     Subjective    Subjective  Subjective: pt restingin bed  Pain: denies pain  Orientation  Overall Orientation Status: Within Functional Limits  Orientation Level: Oriented X4  Cognition  Overall Cognitive Status: WFL     Objective   Vitals  Pulse: 76  BP: 136/71  BP Location: Right upper arm  MAP (Calculated): 93  Bed Mobility Training  Bed Mobility Training: Yes  Overall Level of Assistance: Modified independent  Supine to Sit: Modified independent  Sit to Supine: Other (comment) (up to the chair)  Balance  Sitting: Intact  Standing: Intact  Standing - Static: Good  Standing - Dynamic: Good  Transfer Training  Transfer Training: Yes  Overall Level of Assistance: Independent  Interventions: Verbal cues  Sit to Stand: Independent  Stand to Sit: Independent  Gait  Gait Training: Yes  Overall Level of Assistance: Supervision  Distance  (ft): 300 Feet  Assistive Device: None  Speed/Jenniffer: Slow     PT Exercises  Exercise Treatment: marching, LAQ, ankle pumps x 15 B     Safety Devices  Type of Devices: Call light within reach;Left in chair;Nurse notified  Restraints  Restraints Initially in Place: No       Goals  Short Term Goals  Time Frame for Short Term Goals: 7/31  Short Term Goal 1: Pt will ambulate 300 ft with Mod I and LRAD or no AD. - MET  Short Term Goal 2: Pt will complete bed mobility with IND. MET  Short Term Goal 3: Pt will perform STS transfers with Mod I and LRAD or no AD. MET  Short Term Goal 4: Pt will complete 12-15 reps of LE exercise by 7/28 to improve functional mobility. MET  Patient Goals   Patient Goals : \"To get better and get back to life\"    Education  Patient Education  Education Given To: Patient  Education Provided: Role of Therapy;Precautions;Energy Conservation;Fall Prevention Strategies;Family Education;Transfer Training  Education Provided Comments: role of PT, importance of mobility, POC goals, family ed, importance of sleep for recovery  Education Method: Verbal  Barriers to Learning: None  Education Outcome: Verbalized understanding;Demonstrated understanding           Therapy Time   Individual Concurrent Group Co-treatment   Time In 0900         Time Out 0923         Minutes 23                 Cici Gonzalez, PT

## 2024-07-26 NOTE — PROGRESS NOTES
Infectious Disease Follow up Notes    CC :  infection of AUS s/p explant      Antibiotics:  Fluconazole 400 IV q24  Zosyn 3.375 q8     Admit Date:   7/24/2024  Hospital Day: 3    Subjective:   He remains AF   Will DC with [facility]   Pain is controlled  He is eager for discharge     Objective:     Patient Vitals for the past 8 hrs:   BP Temp Temp src Pulse Resp SpO2   07/26/24 1004 136/71 -- -- 76 -- --   07/26/24 0832 136/71 97.6 °F (36.4 °C) Oral 76 16 98 %       EXAM:  Alert, oriented, NAD   Exposed skin without rash  Abd soft, flat, NT        LINE:     PIV in place       Scheduled Meds:   amLODIPine  5 mg Oral Daily    famotidine  10 mg Oral Daily    melatonin  5 mg Oral Nightly    polyethylene glycol  17 g Oral Daily    sodium chloride flush  5-40 mL IntraVENous 2 times per day    enoxaparin  40 mg SubCUTAneous Daily    piperacillin-tazobactam  3,375 mg IntraVENous Q8H    fluconazole  400 mg IntraVENous Q24H    acetaminophen  650 mg Oral 4 times per day    sodium chloride flush  5-40 mL IntraVENous 2 times per day    ondansetron  4 mg IntraVENous Once       Continuous Infusions:   sodium chloride Stopped (07/25/24 1509)    sodium chloride            Data Review:    Lab Results   Component Value Date    WBC 12.7 (H) 07/26/2024    HGB 13.1 (L) 07/26/2024    HCT 37.1 (L) 07/26/2024    MCV 88.7 07/26/2024     07/26/2024     Lab Results   Component Value Date    CREATININE 0.9 07/25/2024    BUN 15 07/25/2024     (L) 07/25/2024    K 4.3 07/25/2024    CL 97 (L) 07/25/2024    CO2 23 07/25/2024       Hepatic Function Panel:   Lab Results   Component Value Date/Time    ALKPHOS 66 10/09/2023 08:25 AM    ALT 11 10/09/2023 08:25 AM    AST 17 10/09/2023 08:25 AM    BILITOT 0.8 10/09/2023 08:25 AM       Cultures:   7/20     UC 4000 cfu/ml E coli, no sensitivity testing      7/24     Multiple operative cultures in process, one with rare  anemia 04/09/2015    Orthostatic hypotension 04/09/2015    Lumbar disc herniation 08/13/2014     Overview Note:     Overview:   Right L3-4      Lumbar spondylosis 08/13/2014     Overview Note:     Overview:   Right L3-4      Benign essential HTN 11/12/2013    Hyperlipidemia 10/24/2011    Thoracic aortic aneurysm without rupture (HCC) 10/24/2011     Overview Note:     replace inactive diagnosis         Remote history of prostate cancer s/p radiation  Urinary incontinence   S/p AUS 5/23/34, activated 7/1/24     Admitted [facility] [place] 7/20/24 with infection of AUS   Urine culture had low level growth of E coli   He was discharged on 7/22/24 on Bactrim and fluconazole with plan for explant   Readmitted 7/24/24 for planned explant.  One of several surgical cultures is positive for E coli with sensitivity pending.  He is clinically stable.  Trejo in place, to remain after discharge     No abx allergies    Plan:     Ok for DC from ID standpoint, start Augmentin and resume fluconazole, both for 10d more - Rx sent to [facility] pharmacy         Discussed with patient/family, all questions answered        Shahnaz Ruff MD  Phone: [phone]   Fax : [phone]

## 2024-07-26 NOTE — CARE COORDINATION
CASE MANAGEMENT DISCHARGE SUMMARY      Discharge to: [facility] to [facility]- declined Home Care, edu able to contact PCP if he changes his mind. Both Patient and Spouse edu on [facility].     IMM given: (date) 07/25/2024    New Durable Medical Equipment ordered/agency: [facility] sent by RN     Transportation: via private car     Confirmed discharge plan with: Patient and Spouse in room     RN, name: VIRGILIO Robert     Note: Discharging nurse to complete RAKESH, reconcile AVS, and place final copy with patient's discharge packet. .ADELINA Patterson

## 2024-07-27 LAB
BACTERIA SPEC AEROBE CULT: ABNORMAL
BACTERIA SPEC AEROBE CULT: NORMAL
BACTERIA SPEC ANAEROBE CULT: ABNORMAL
BACTERIA SPEC ANAEROBE CULT: ABNORMAL
BACTERIA SPEC ANAEROBE CULT: NORMAL
GRAM STN SPEC: ABNORMAL
GRAM STN SPEC: ABNORMAL
GRAM STN SPEC: NORMAL
ORGANISM: ABNORMAL
ORGANISM: ABNORMAL

## 2024-07-28 LAB
BACTERIA BLD CULT ORG #2: NORMAL
BACTERIA BLD CULT: NORMAL

## 2024-07-29 NOTE — DISCHARGE SUMMARY
DISCHARGE SUMMARY NOTE:      Patient Identification  Glenn Ortiz is a 89 y.o. male.  :  1935  Admit Date:  2024    Discharge date:   2024  6:28 PM                                   Disposition: home    Discharge Diagnoses:   Patient Active Problem List   Diagnosis    Hyperlipidemia    Thoracic aortic aneurysm without rupture (HCC)    Benign essential HTN    Gastroesophageal reflux disease without esophagitis    Achalasia    History of malignant neoplasm of kidney    Thoracic ascending aortic aneurysm (HCC)    Adenomatous polyp of colon    Acute blood loss anemia    Age-related nuclear cataract of left eye    Choroidal nevus, right eye    DDD (degenerative disc disease), lumbosacral    Esophageal stricture    Lumbar disc herniation    Orthostatic hypotension    Other biomechanical lesions of lumbar region    Post-laminectomy syndrome    Presence of intraocular lens    Retinal edema    Secondary cataract of left eye with vision obscured    Small bowel polyp    Lumbar spondylosis    Vitreomacular adhesion, left eye    Vitreous degeneration, left eye    Stress incontinence, male    First degree AV block    IFG (impaired fasting glucose)    Left upper lobe pulmonary nodule    Chronic right-sided low back pain with right-sided sciatica    Lumbar radiculopathy    Lumbar foraminal stenosis    PVD (peripheral vascular disease) (HCC)    Idiopathic peripheral neuropathy    RLS (restless legs syndrome)    Urge incontinence of urine    Resting tremor    Status post implantation of artificial urinary sphincter         Consults: ID, Internal medicine    Surgery: Explantation of artificial urinary sphincter, urethroplasty and cystoscopy with Dr. Bedolla on 24 for AUS cuff erosion and infected AUS    Condition on discharge: stable    Patient Instructions:   Activity: no heavy lifting, pushing, pulling for 6 weeks, no driving while on analgesics  Diet: As tolerated  Follow-up with Dr. Wiley in 4

## 2024-07-30 ENCOUNTER — TELEPHONE (OUTPATIENT)
Dept: INTERNAL MEDICINE CLINIC | Age: 89
End: 2024-07-30

## 2024-07-30 NOTE — TELEPHONE ENCOUNTER
He can increase the melatonin to 10 mg.  You can set him up for a phone consultation or video visit

## 2024-07-30 NOTE — TELEPHONE ENCOUNTER
It looks like patient is going to need a hospital follow-up.  There is also another message from him asking for Ambien.  I generally do not write that medication for his age group-too dangerous.  I would recommend only taking melatonin.  He can start with 5 mg nightly.

## 2024-07-30 NOTE — TELEPHONE ENCOUNTER
Care Transitions Initial Follow Up Call    Outreach made within 2 business days of discharge: Yes    Patient: Glenn Ortiz Patient : 1935   MRN: [id number]  Reason for Admission: Status post implantation of artificial urinary sphincter   Discharge Date: 24       Spoke with:     Discharge department/facility: Home    TCM Interactive Patient Contact:  Was patient able to fill all prescriptions: Yes  Was patient instructed to bring all medications to the follow-up visit: Yes  Is patient taking all medications as directed in the discharge summary? Yes  Does patient understand their discharge instructions: Yes  Does patient have questions or concerns that need addressed prior to 7-14 day follow up office visit: yes     Additional needs identified to be addressed with provider  he is concerned about the catheter coming out and what should he do when that happens. Would he have to go to the hospital?           Patient was not scheduled for an appointment with PCP within 7-14 days due to patient declining this with his bag and everything. It was offered but denied twice. He stated \"I would not want to get to [facility] in 7-10 days. That would be wasting my time.\"     Follow Up  Future Appointments   Date Time Provider Department Center   10/22/2024  8:30 AM Mikhail Gallardo DO [facility] [place] [facility] [place], [place]

## 2024-07-30 NOTE — TELEPHONE ENCOUNTER
Patient has been advised. Patient states he is already taking 5 mg of melatonin. Can this be increased or is there any other recommendation?    Patient sates he is unable to go anywhere for 4 weeks so unable to scheduled hospital f/u at this time. Currently scheduled for AWV in October. Do you want to see him sooner than that?

## 2024-08-01 NOTE — TELEPHONE ENCOUNTER
Spoke with patient and relayed the ok to increase melatonin to 10mg    Pt scheduled for phone visit / hosp f/u on Wed Aug 7 at 2:30pm

## 2024-08-05 LAB
FUNGUS SPEC CULT: NORMAL
LOEFFLER MB STN SPEC: NORMAL

## 2024-08-12 LAB
FUNGUS SPEC CULT: NORMAL
LOEFFLER MB STN SPEC: NORMAL

## 2024-08-19 LAB
FUNGUS SPEC CULT: NORMAL
LOEFFLER MB STN SPEC: NORMAL

## 2024-08-26 LAB
FUNGUS SPEC CULT: NORMAL
LOEFFLER MB STN SPEC: NORMAL

## 2024-09-11 DIAGNOSIS — I10 BENIGN ESSENTIAL HTN: Primary | ICD-10-CM

## 2024-09-12 RX ORDER — AMLODIPINE BESYLATE 5 MG/1
5 TABLET ORAL DAILY
Qty: 90 TABLET | Refills: 1 | Status: SHIPPED | OUTPATIENT
Start: 2024-09-12

## 2024-09-17 ENCOUNTER — APPOINTMENT (OUTPATIENT)
Dept: GENERAL RADIOLOGY | Age: 89
End: 2024-09-17
Attending: PHYSICAL MEDICINE & REHABILITATION
Payer: MEDICARE

## 2024-09-17 ENCOUNTER — HOSPITAL ENCOUNTER (OUTPATIENT)
Age: 89
Setting detail: OUTPATIENT SURGERY
Discharge: HOME | End: 2024-09-17
Attending: PHYSICAL MEDICINE & REHABILITATION | Admitting: PHYSICAL MEDICINE & REHABILITATION
Payer: MEDICARE

## 2024-09-17 VITALS
SYSTOLIC BLOOD PRESSURE: 123 MMHG | BODY MASS INDEX: 26.31 KG/M2 | HEIGHT: 74 IN | RESPIRATION RATE: 16 BRPM | DIASTOLIC BLOOD PRESSURE: 72 MMHG | OXYGEN SATURATION: 99 % | HEART RATE: 67 BPM | TEMPERATURE: 97.9 F | WEIGHT: 205 LBS

## 2024-09-17 PROCEDURE — 2500000003 HC RX 250 WO HCPCS: Performed by: PHYSICAL MEDICINE & REHABILITATION

## 2024-09-17 PROCEDURE — 2709999900 HC NON-CHARGEABLE SUPPLY: Performed by: PHYSICAL MEDICINE & REHABILITATION

## 2024-09-17 PROCEDURE — 99152 MOD SED SAME PHYS/QHP 5/>YRS: CPT | Performed by: PHYSICAL MEDICINE & REHABILITATION

## 2024-09-17 PROCEDURE — 3610000054 HC PAIN LEVEL 3 BASE (NON-OR): Performed by: PHYSICAL MEDICINE & REHABILITATION

## 2024-09-17 PROCEDURE — 6360000002 HC RX W HCPCS: Performed by: PHYSICAL MEDICINE & REHABILITATION

## 2024-09-17 PROCEDURE — 77003 FLUOROGUIDE FOR SPINE INJECT: CPT

## 2024-09-17 RX ORDER — MIDAZOLAM HYDROCHLORIDE 1 MG/ML
INJECTION INTRAMUSCULAR; INTRAVENOUS
Status: COMPLETED | OUTPATIENT
Start: 2024-09-17 | End: 2024-09-17

## 2024-09-17 RX ORDER — LIDOCAINE HYDROCHLORIDE 10 MG/ML
INJECTION, SOLUTION EPIDURAL; INFILTRATION; INTRACAUDAL; PERINEURAL
Status: COMPLETED | OUTPATIENT
Start: 2024-09-17 | End: 2024-09-17

## 2024-09-17 RX ORDER — DEXAMETHASONE SODIUM PHOSPHATE 10 MG/ML
INJECTION, SOLUTION INTRAMUSCULAR; INTRAVENOUS
Status: COMPLETED | OUTPATIENT
Start: 2024-09-17 | End: 2024-09-17

## 2024-09-17 ASSESSMENT — PAIN - FUNCTIONAL ASSESSMENT: PAIN_FUNCTIONAL_ASSESSMENT: 0-10

## 2024-10-18 SDOH — HEALTH STABILITY: PHYSICAL HEALTH: ON AVERAGE, HOW MANY DAYS PER WEEK DO YOU ENGAGE IN MODERATE TO STRENUOUS EXERCISE (LIKE A BRISK WALK)?: 4 DAYS

## 2024-10-18 SDOH — HEALTH STABILITY: PHYSICAL HEALTH: ON AVERAGE, HOW MANY MINUTES DO YOU ENGAGE IN EXERCISE AT THIS LEVEL?: 70 MIN

## 2024-10-18 ASSESSMENT — LIFESTYLE VARIABLES
HOW OFTEN DO YOU HAVE SIX OR MORE DRINKS ON ONE OCCASION: 1
HOW MANY STANDARD DRINKS CONTAINING ALCOHOL DO YOU HAVE ON A TYPICAL DAY: PATIENT DOES NOT DRINK
HOW OFTEN DO YOU HAVE A DRINK CONTAINING ALCOHOL: NEVER
HOW MANY STANDARD DRINKS CONTAINING ALCOHOL DO YOU HAVE ON A TYPICAL DAY: 0
HOW OFTEN DO YOU HAVE A DRINK CONTAINING ALCOHOL: 1

## 2024-10-18 ASSESSMENT — PATIENT HEALTH QUESTIONNAIRE - PHQ9
1. LITTLE INTEREST OR PLEASURE IN DOING THINGS: SEVERAL DAYS
2. FEELING DOWN, DEPRESSED OR HOPELESS: NOT AT ALL
SUM OF ALL RESPONSES TO PHQ9 QUESTIONS 1 & 2: 1
SUM OF ALL RESPONSES TO PHQ QUESTIONS 1-9: 1

## 2024-10-22 ENCOUNTER — OFFICE VISIT (OUTPATIENT)
Dept: INTERNAL MEDICINE CLINIC | Age: 89
End: 2024-10-22

## 2024-10-22 VITALS
HEART RATE: 86 BPM | TEMPERATURE: 97.1 F | DIASTOLIC BLOOD PRESSURE: 60 MMHG | BODY MASS INDEX: 27.35 KG/M2 | WEIGHT: 206.4 LBS | HEIGHT: 73 IN | RESPIRATION RATE: 16 BRPM | SYSTOLIC BLOOD PRESSURE: 128 MMHG | OXYGEN SATURATION: 97 %

## 2024-10-22 DIAGNOSIS — Z78.9 STATIN INTOLERANCE: ICD-10-CM

## 2024-10-22 DIAGNOSIS — Z13.1 SCREENING FOR DIABETES MELLITUS: ICD-10-CM

## 2024-10-22 DIAGNOSIS — I10 BENIGN ESSENTIAL HTN: ICD-10-CM

## 2024-10-22 DIAGNOSIS — C61 PROSTATE CANCER (HCC): ICD-10-CM

## 2024-10-22 DIAGNOSIS — G20.A1 PARKINSON'S DISEASE WITHOUT DYSKINESIA OR FLUCTUATING MANIFESTATIONS (HCC): ICD-10-CM

## 2024-10-22 DIAGNOSIS — E78.5 HYPERLIPIDEMIA, UNSPECIFIED HYPERLIPIDEMIA TYPE: ICD-10-CM

## 2024-10-22 DIAGNOSIS — D64.9 ANEMIA, UNSPECIFIED TYPE: ICD-10-CM

## 2024-10-22 DIAGNOSIS — Z00.00 MEDICARE ANNUAL WELLNESS VISIT, SUBSEQUENT: Primary | ICD-10-CM

## 2024-10-22 DIAGNOSIS — I49.1 PAC (PREMATURE ATRIAL CONTRACTION): ICD-10-CM

## 2024-10-22 DIAGNOSIS — E87.1 HYPONATREMIA: ICD-10-CM

## 2024-10-22 DIAGNOSIS — G60.9 IDIOPATHIC PERIPHERAL NEUROPATHY: ICD-10-CM

## 2024-10-22 DIAGNOSIS — R73.01 IFG (IMPAIRED FASTING GLUCOSE): ICD-10-CM

## 2024-10-22 DIAGNOSIS — I49.9 IRREGULAR HEARTBEAT: ICD-10-CM

## 2024-10-22 DIAGNOSIS — Z00.00 MEDICARE ANNUAL WELLNESS VISIT, SUBSEQUENT: ICD-10-CM

## 2024-10-22 PROBLEM — Z96.1 PRESENCE OF INTRAOCULAR LENS: Status: RESOLVED | Noted: 2019-07-10 | Resolved: 2024-10-22

## 2024-10-22 PROBLEM — M99.83 OTHER BIOMECHANICAL LESIONS OF LUMBAR REGION: Status: RESOLVED | Noted: 2018-05-15 | Resolved: 2024-10-22

## 2024-10-22 PROBLEM — H35.81 RETINAL EDEMA: Status: RESOLVED | Noted: 2019-07-10 | Resolved: 2024-10-22

## 2024-10-22 PROBLEM — Z96.0 STATUS POST IMPLANTATION OF ARTIFICIAL URINARY SPHINCTER: Status: RESOLVED | Noted: 2024-07-24 | Resolved: 2024-10-22

## 2024-10-22 PROBLEM — K63.89 SMALL BOWEL POLYP: Status: RESOLVED | Noted: 2019-05-09 | Resolved: 2024-10-22

## 2024-10-22 PROBLEM — H25.12 AGE-RELATED NUCLEAR CATARACT OF LEFT EYE: Status: RESOLVED | Noted: 2019-07-10 | Resolved: 2024-10-22

## 2024-10-22 RX ORDER — FAMOTIDINE 40 MG/1
40 TABLET, FILM COATED ORAL DAILY
COMMUNITY

## 2024-10-22 NOTE — PROGRESS NOTES
He appears stated age and well nourished.  He is cooperative and pleasant.    HEAD:  NC/AT, no lesions.  EYES:  JUWAN, EOMI, No scleral icterus or conjunctival injection or discharge.  Visual fields in tact to confrontation.    EARS:  EAC's clear, TM's normal.  NOSE:  Nasal cavity is clear.  No mucosal congestion or discharge.  Sinuses are nontender.  MOUTH & THROAT:  Oral cavity is clear without mucosal lesions.  Tongue is midline.  Dentition is in good repair.  No pharyngeal erythema or exudate.  NECK:  Supple.  Full ROM.  Trachea is midline.  No increased JVD.  No thyromegaly or nodules.  No masses  LYMPH: No C/SC/A/F nodes  CARDIAC:  S1S2 NL.  Regular with frequent ectopy.    VASC:  Pedal pulses 2/4.  Carotid upstrokes 2+.  No bruits noted.    PULM:  Lungs are CTA.  Symmetric breath sounds noted.  AP Diameter NL.  GI:  Abdomen is soft and nontender.  No distension.  No organomegaly.  No masses.  No pulsatile masses.    EXT:  No Cyanosis or clubbing.  No edema.    SKIN: Warm and dry, normal turgor, no rash or lesions of concern.  NEURO:  Cranial nerves 2-12 are NL.  Speech fluent and coherent.  Strength is 5/5 in all muscle groups.  No sensory deficits.  No focal or lateralizing deficits.  Reflexes 2/4 and symmetric.  Gait is normal.   + Resting tremor right UE.  + Hypomimia, + Mild Bradyphrenia.    MS:  No C/T/L paraspinal tenderness.  No scoliosis.  No joint effusions.  Full joint ROM.    PSYCH:  Mood and affect NL.  Judgement and insight NL.                 Reviewed and updated this visit:  Allergies  Meds  Med Hx  Surg Hx  Soc Hx  Fam Hx        ASSESSMENT/PLAN     ASSESSMENT/PLAN:    1. Medicare annual wellness visit, subsequent  All care gaps identified and addressed  Flu vaccine is up-to-date  Patient declines COVID-19 vaccine  - CBC with Auto Differential; Future  - Comprehensive Metabolic Panel; Future  - Lipid Panel; Future  - Hemoglobin A1C; Future  - TSH; Future    2. Benign essential HTN  Blood

## 2024-10-22 NOTE — PATIENT INSTRUCTIONS
when you think you need it. With time, your taste buds will adjust to less salt.     Eat fewer snack items, fast foods, canned soups, and other high-salt, high-fat, processed foods.     Read food labels and try to avoid saturated and trans fats. They increase your risk of heart disease by raising cholesterol levels.     Limit the amount of solid fat--butter, margarine, and shortening--you eat. Use olive, peanut, or canola oil when you cook. Bake, broil, and steam foods instead of frying them.     Eat a variety of fruit and vegetables every day. Dark green, deep orange, red, or yellow fruits and vegetables are especially good for you. Examples include spinach, carrots, peaches, and berries.     Foods high in fiber can reduce your cholesterol and provide important vitamins and minerals. High-fiber foods include whole-grain cereals and breads, oatmeal, beans, brown rice, citrus fruits, and apples.     Eat lean proteins. Heart-healthy proteins include seafood, lean meats and poultry, eggs, beans, peas, nuts, seeds, and soy products.     Limit drinks and foods with added sugar. These include candy, desserts, and soda pop.   Heart-healthy lifestyle    If your doctor recommends it, get more exercise. For many people, walking is a good choice. Or you may want to swim, bike, or do other activities. Bit by bit, increase the time you're active every day. Try for at least 30 minutes on most days of the week.     Try to quit or cut back on using tobacco and other nicotine products. This includes smoking and vaping. If you need help quitting, talk to your doctor about stop-smoking programs and medicines. These can increase your chances of quitting for good. Quitting is one of the most important things you can do to protect your heart. It is never too late to quit. Try to avoid secondhand smoke too.     Stay at a weight that's healthy for you. Talk to your doctor if you need help losing weight.     Try to get 7 to 9 hours of sleep

## 2024-10-23 LAB
ALBUMIN SERPL-MCNC: 4.6 G/DL (ref 3.4–5)
ALBUMIN/GLOB SERPL: 2.1 {RATIO} (ref 1.1–2.2)
ALP SERPL-CCNC: 77 U/L (ref 40–129)
ALT SERPL-CCNC: 12 U/L (ref 10–40)
ANION GAP SERPL CALCULATED.3IONS-SCNC: 11 MMOL/L (ref 3–16)
AST SERPL-CCNC: 20 U/L (ref 15–37)
BASOPHILS # BLD: 0 K/UL (ref 0–0.2)
BASOPHILS NFR BLD: 0.5 %
BILIRUB SERPL-MCNC: 0.7 MG/DL (ref 0–1)
BUN SERPL-MCNC: 16 MG/DL (ref 7–20)
CALCIUM SERPL-MCNC: 9.2 MG/DL (ref 8.3–10.6)
CHLORIDE SERPL-SCNC: 101 MMOL/L (ref 99–110)
CHOLEST SERPL-MCNC: 224 MG/DL (ref 0–199)
CO2 SERPL-SCNC: 25 MMOL/L (ref 21–32)
CREAT SERPL-MCNC: 1 MG/DL (ref 0.8–1.3)
DEPRECATED RDW RBC AUTO: 14.9 % (ref 12.4–15.4)
EOSINOPHIL # BLD: 0.1 K/UL (ref 0–0.6)
EOSINOPHIL NFR BLD: 2.1 %
EST. AVERAGE GLUCOSE BLD GHB EST-MCNC: 96.8 MG/DL
GFR SERPLBLD CREATININE-BSD FMLA CKD-EPI: 72 ML/MIN/{1.73_M2}
GLUCOSE SERPL-MCNC: 99 MG/DL (ref 70–99)
HBA1C MFR BLD: 5 %
HCT VFR BLD AUTO: 40.5 % (ref 40.5–52.5)
HDLC SERPL-MCNC: 58 MG/DL (ref 40–60)
HGB BLD-MCNC: 13.3 G/DL (ref 13.5–17.5)
LDLC SERPL CALC-MCNC: 147 MG/DL
LYMPHOCYTES # BLD: 1.9 K/UL (ref 1–5.1)
LYMPHOCYTES NFR BLD: 27 %
MCH RBC QN AUTO: 30.8 PG (ref 26–34)
MCHC RBC AUTO-ENTMCNC: 32.9 G/DL (ref 31–36)
MCV RBC AUTO: 93.6 FL (ref 80–100)
MONOCYTES # BLD: 0.6 K/UL (ref 0–1.3)
MONOCYTES NFR BLD: 8.3 %
NEUTROPHILS # BLD: 4.3 K/UL (ref 1.7–7.7)
NEUTROPHILS NFR BLD: 62.1 %
PLATELET # BLD AUTO: 308 K/UL (ref 135–450)
PMV BLD AUTO: 9 FL (ref 5–10.5)
POTASSIUM SERPL-SCNC: 4.1 MMOL/L (ref 3.5–5.1)
PROT SERPL-MCNC: 6.8 G/DL (ref 6.4–8.2)
RBC # BLD AUTO: 4.33 M/UL (ref 4.2–5.9)
SODIUM SERPL-SCNC: 137 MMOL/L (ref 136–145)
TRIGL SERPL-MCNC: 97 MG/DL (ref 0–150)
TSH SERPL DL<=0.005 MIU/L-ACNC: 2.08 UIU/ML (ref 0.27–4.2)
VLDLC SERPL CALC-MCNC: 19 MG/DL
WBC # BLD AUTO: 6.9 K/UL (ref 4–11)

## 2024-12-10 DIAGNOSIS — I10 BENIGN ESSENTIAL HTN: ICD-10-CM

## 2024-12-11 NOTE — TELEPHONE ENCOUNTER
Last appointment: 10/22/2024  Next appointment: Visit date not found  Return in about 6 months (around 4/22/2025).   Last refill: 09/12/2024  Sent [employer] message to schedule next appointment due in April.

## 2024-12-13 RX ORDER — AMLODIPINE BESYLATE 5 MG/1
5 TABLET ORAL DAILY
Qty: 90 TABLET | Refills: 1 | Status: SHIPPED | OUTPATIENT
Start: 2024-12-13

## 2025-01-15 DIAGNOSIS — I10 BENIGN ESSENTIAL HTN: ICD-10-CM

## 2025-01-16 RX ORDER — AMLODIPINE BESYLATE 5 MG/1
5 TABLET ORAL DAILY
Qty: 90 TABLET | Refills: 3 | Status: SHIPPED | OUTPATIENT
Start: 2025-01-16

## 2025-01-16 NOTE — TELEPHONE ENCOUNTER
Last appointment: 10/22/2024  Next appointment: Visit date not found  (around 4/22/2025 for 6 mo follow up   Sent [employer] message to schedule next appointment due in April.    Last refill: 12/13/24

## 2025-04-27 SDOH — ECONOMIC STABILITY: INCOME INSECURITY: IN THE LAST 12 MONTHS, WAS THERE A TIME WHEN YOU WERE NOT ABLE TO PAY THE MORTGAGE OR RENT ON TIME?: NO

## 2025-04-27 SDOH — ECONOMIC STABILITY: TRANSPORTATION INSECURITY
IN THE PAST 12 MONTHS, HAS THE LACK OF TRANSPORTATION KEPT YOU FROM MEDICAL APPOINTMENTS OR FROM GETTING MEDICATIONS?: NO

## 2025-04-27 SDOH — ECONOMIC STABILITY: FOOD INSECURITY: WITHIN THE PAST 12 MONTHS, YOU WORRIED THAT YOUR FOOD WOULD RUN OUT BEFORE YOU GOT MONEY TO BUY MORE.: NEVER TRUE

## 2025-04-27 SDOH — ECONOMIC STABILITY: FOOD INSECURITY: WITHIN THE PAST 12 MONTHS, THE FOOD YOU BOUGHT JUST DIDN'T LAST AND YOU DIDN'T HAVE MONEY TO GET MORE.: NEVER TRUE

## 2025-04-27 ASSESSMENT — PATIENT HEALTH QUESTIONNAIRE - PHQ9
1. LITTLE INTEREST OR PLEASURE IN DOING THINGS: NOT AT ALL
SUM OF ALL RESPONSES TO PHQ QUESTIONS 1-9: 0
SUM OF ALL RESPONSES TO PHQ QUESTIONS 1-9: 0
2. FEELING DOWN, DEPRESSED OR HOPELESS: NOT AT ALL
2. FEELING DOWN, DEPRESSED OR HOPELESS: NOT AT ALL
SUM OF ALL RESPONSES TO PHQ QUESTIONS 1-9: 0
SUM OF ALL RESPONSES TO PHQ QUESTIONS 1-9: 0
1. LITTLE INTEREST OR PLEASURE IN DOING THINGS: NOT AT ALL
SUM OF ALL RESPONSES TO PHQ9 QUESTIONS 1 & 2: 0

## 2025-04-28 ENCOUNTER — OFFICE VISIT (OUTPATIENT)
Dept: INTERNAL MEDICINE CLINIC | Age: 89
End: 2025-04-28
Payer: MEDICARE

## 2025-04-28 VITALS
BODY MASS INDEX: 27.93 KG/M2 | DIASTOLIC BLOOD PRESSURE: 70 MMHG | TEMPERATURE: 97.2 F | OXYGEN SATURATION: 97 % | HEART RATE: 63 BPM | SYSTOLIC BLOOD PRESSURE: 128 MMHG | WEIGHT: 213 LBS

## 2025-04-28 DIAGNOSIS — I71.21 ANEURYSM OF ASCENDING AORTA WITHOUT RUPTURE: ICD-10-CM

## 2025-04-28 DIAGNOSIS — R91.1 LEFT UPPER LOBE PULMONARY NODULE: ICD-10-CM

## 2025-04-28 DIAGNOSIS — Z13.1 SCREENING FOR DIABETES MELLITUS: ICD-10-CM

## 2025-04-28 DIAGNOSIS — E78.5 HYPERLIPIDEMIA, UNSPECIFIED HYPERLIPIDEMIA TYPE: ICD-10-CM

## 2025-04-28 DIAGNOSIS — G25.81 RLS (RESTLESS LEGS SYNDROME): ICD-10-CM

## 2025-04-28 DIAGNOSIS — I10 BENIGN ESSENTIAL HTN: Primary | ICD-10-CM

## 2025-04-28 DIAGNOSIS — G60.8 POLYNEUROPATHY, PERIPHERAL SENSORIMOTOR AXONAL: ICD-10-CM

## 2025-04-28 DIAGNOSIS — R73.01 IFG (IMPAIRED FASTING GLUCOSE): ICD-10-CM

## 2025-04-28 DIAGNOSIS — K21.9 GASTROESOPHAGEAL REFLUX DISEASE WITHOUT ESOPHAGITIS: ICD-10-CM

## 2025-04-28 DIAGNOSIS — G20.A1 PARKINSON'S DISEASE WITHOUT DYSKINESIA OR FLUCTUATING MANIFESTATIONS (HCC): ICD-10-CM

## 2025-04-28 DIAGNOSIS — I25.10 CORONARY ARTERY CALCIFICATION SEEN ON CAT SCAN: ICD-10-CM

## 2025-04-28 DIAGNOSIS — G60.9 IDIOPATHIC PERIPHERAL NEUROPATHY: ICD-10-CM

## 2025-04-28 DIAGNOSIS — C61 PROSTATE CANCER (HCC): ICD-10-CM

## 2025-04-28 PROCEDURE — G2211 COMPLEX E/M VISIT ADD ON: HCPCS | Performed by: INTERNAL MEDICINE

## 2025-04-28 PROCEDURE — G8427 DOCREV CUR MEDS BY ELIG CLIN: HCPCS | Performed by: INTERNAL MEDICINE

## 2025-04-28 PROCEDURE — 1123F ACP DISCUSS/DSCN MKR DOCD: CPT | Performed by: INTERNAL MEDICINE

## 2025-04-28 PROCEDURE — 1036F TOBACCO NON-USER: CPT | Performed by: INTERNAL MEDICINE

## 2025-04-28 PROCEDURE — 99214 OFFICE O/P EST MOD 30 MIN: CPT | Performed by: INTERNAL MEDICINE

## 2025-04-28 PROCEDURE — 1159F MED LIST DOCD IN RCRD: CPT | Performed by: INTERNAL MEDICINE

## 2025-04-28 PROCEDURE — G8417 CALC BMI ABV UP PARAM F/U: HCPCS | Performed by: INTERNAL MEDICINE

## 2025-04-28 RX ORDER — ROPINIROLE 1 MG/1
1 TABLET, FILM COATED ORAL 2 TIMES DAILY
COMMUNITY

## 2025-04-28 NOTE — PROGRESS NOTES
[facility]  Internal Medicine  Patient Encounter  Mikhail Gallardo D.O., [facility]        Chief Complaint   Patient presents with    Check-Up     6 mo       HPI: 89 y.o. male seen today for his routine checkup regarding the status of current issues listed below along with a medication review and reconciliation.    Achalasia--patient has had Botox injections in the past.  He switched gastroenterologist and is now under the care of Dr. Hendricks.      Hypertension--Patient remains on amlodipine 5 mg daily.  He has done well with this medication.  He denies any headaches, lightheadedness, syncope, unilateral weakness, speech or vision changes.    Thoracic ascending aortic aneurysm--last CT scan in 6/8/2022.  Aneurysm measured 4.5 cm which was unchanged from 2020.  Patient has opted not to pursue any further evaluation.    Pulmonary nodule--incidentally identified on CT scan on 6/8/2022.  Follow-up CAT scan was recommended.  Patient opted not to pursue any further evaluation.  Patient denies any cough, hemoptysis.    Chronic low back pain-- Pain generators include scoliosis, DDD, Facet arthritis, Spondylolisthesis, Spondylosis, lumbar foraminal stenosis.  He is under the care of Dr. Venita Charles.  He also sees Dr. Payne.  Pt C/O pain that radiates to the groin, thigh, buttocks.      RLS/peripheral neuropathy/Tremor-- He has seen Dr. Gibbons.  2/6/2025.  Note reviewed.  He is on Requip 0.5 mg BID.  Pt did not tolerate TID.  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  He was to follow up in August.   Right hand tremor is mild.      Prostate cancer--patient status post da Jaz assisted prostatectomy in 1997 and XRT.  He XRT was done in 2008.  It is thought that he has had some recurrence.  His PSA has increased to near 2 in May 2024.  He is receiving Lupron.  Patient had a PET/CT on 11/6/2024.  Incidentally this showed atherosclerotic changes in the aorta and coronary arteries.  He is not receiving Lupron yet.

## 2025-04-29 DIAGNOSIS — Z13.1 SCREENING FOR DIABETES MELLITUS: ICD-10-CM

## 2025-04-29 DIAGNOSIS — I10 BENIGN ESSENTIAL HTN: ICD-10-CM

## 2025-04-29 DIAGNOSIS — R73.01 IFG (IMPAIRED FASTING GLUCOSE): ICD-10-CM

## 2025-04-29 DIAGNOSIS — E78.5 HYPERLIPIDEMIA, UNSPECIFIED HYPERLIPIDEMIA TYPE: ICD-10-CM

## 2025-04-29 DIAGNOSIS — C61 PROSTATE CANCER (HCC): ICD-10-CM

## 2025-04-29 LAB
ALBUMIN SERPL-MCNC: 4.5 G/DL (ref 3.4–5)
ALBUMIN/GLOB SERPL: 2 {RATIO} (ref 1.1–2.2)
ALP SERPL-CCNC: 73 U/L (ref 40–129)
ALT SERPL-CCNC: 12 U/L (ref 10–40)
ANION GAP SERPL CALCULATED.3IONS-SCNC: 12 MMOL/L (ref 3–16)
AST SERPL-CCNC: 18 U/L (ref 15–37)
BASOPHILS # BLD: 0.1 K/UL (ref 0–0.2)
BASOPHILS NFR BLD: 0.9 %
BILIRUB SERPL-MCNC: 0.7 MG/DL (ref 0–1)
BUN SERPL-MCNC: 16 MG/DL (ref 7–20)
CALCIUM SERPL-MCNC: 9.1 MG/DL (ref 8.3–10.6)
CHLORIDE SERPL-SCNC: 100 MMOL/L (ref 99–110)
CHOLEST SERPL-MCNC: 216 MG/DL (ref 0–199)
CO2 SERPL-SCNC: 26 MMOL/L (ref 21–32)
CREAT SERPL-MCNC: 0.9 MG/DL (ref 0.8–1.3)
DEPRECATED RDW RBC AUTO: 12.7 % (ref 12.4–15.4)
EOSINOPHIL # BLD: 0.2 K/UL (ref 0–0.6)
EOSINOPHIL NFR BLD: 2.4 %
GFR SERPLBLD CREATININE-BSD FMLA CKD-EPI: 81 ML/MIN/{1.73_M2}
GLUCOSE SERPL-MCNC: 87 MG/DL (ref 70–99)
HCT VFR BLD AUTO: 39.4 % (ref 40.5–52.5)
HDLC SERPL-MCNC: 51 MG/DL (ref 40–60)
HGB BLD-MCNC: 13.5 G/DL (ref 13.5–17.5)
LDLC SERPL CALC-MCNC: 142 MG/DL
LYMPHOCYTES # BLD: 2.7 K/UL (ref 1–5.1)
LYMPHOCYTES NFR BLD: 34.9 %
MCH RBC QN AUTO: 30.7 PG (ref 26–34)
MCHC RBC AUTO-ENTMCNC: 34.2 G/DL (ref 31–36)
MCV RBC AUTO: 89.5 FL (ref 80–100)
MONOCYTES # BLD: 0.6 K/UL (ref 0–1.3)
MONOCYTES NFR BLD: 8 %
NEUTROPHILS # BLD: 4.2 K/UL (ref 1.7–7.7)
NEUTROPHILS NFR BLD: 53.8 %
PLATELET # BLD AUTO: 262 K/UL (ref 135–450)
PMV BLD AUTO: 9 FL (ref 5–10.5)
POTASSIUM SERPL-SCNC: 4.3 MMOL/L (ref 3.5–5.1)
PROT SERPL-MCNC: 6.7 G/DL (ref 6.4–8.2)
PSA SERPL DL<=0.01 NG/ML-MCNC: 2.85 NG/ML (ref 0–4)
RBC # BLD AUTO: 4.4 M/UL (ref 4.2–5.9)
SODIUM SERPL-SCNC: 138 MMOL/L (ref 136–145)
TRIGL SERPL-MCNC: 114 MG/DL (ref 0–150)
TSH SERPL DL<=0.005 MIU/L-ACNC: 2.4 UIU/ML (ref 0.27–4.2)
VLDLC SERPL CALC-MCNC: 23 MG/DL
WBC # BLD AUTO: 7.8 K/UL (ref 4–11)

## 2025-04-30 ENCOUNTER — RESULTS FOLLOW-UP (OUTPATIENT)
Dept: INTERNAL MEDICINE CLINIC | Age: 89
End: 2025-04-30

## 2025-04-30 LAB
EST. AVERAGE GLUCOSE BLD GHB EST-MCNC: 105.4 MG/DL
HBA1C MFR BLD: 5.3 %

## 2025-07-03 ENCOUNTER — OFFICE VISIT (OUTPATIENT)
Dept: INTERNAL MEDICINE CLINIC | Age: 89
End: 2025-07-03
Payer: MEDICARE

## 2025-07-03 VITALS
BODY MASS INDEX: 26.96 KG/M2 | OXYGEN SATURATION: 98 % | WEIGHT: 205.6 LBS | HEART RATE: 75 BPM | SYSTOLIC BLOOD PRESSURE: 125 MMHG | DIASTOLIC BLOOD PRESSURE: 70 MMHG | TEMPERATURE: 97.3 F

## 2025-07-03 DIAGNOSIS — M96.1 POST-LAMINECTOMY SYNDROME: ICD-10-CM

## 2025-07-03 DIAGNOSIS — G89.29 CHRONIC BILATERAL LOW BACK PAIN WITH BILATERAL SCIATICA: Primary | ICD-10-CM

## 2025-07-03 DIAGNOSIS — M54.42 CHRONIC BILATERAL LOW BACK PAIN WITH BILATERAL SCIATICA: Primary | ICD-10-CM

## 2025-07-03 DIAGNOSIS — L97.911 TRAUMATIC ULCER OF RIGHT LOWER LEG, LIMITED TO BREAKDOWN OF SKIN (HCC): ICD-10-CM

## 2025-07-03 DIAGNOSIS — M48.062 SPINAL STENOSIS OF LUMBAR REGION WITH NEUROGENIC CLAUDICATION: ICD-10-CM

## 2025-07-03 DIAGNOSIS — G25.81 RLS (RESTLESS LEGS SYNDROME): ICD-10-CM

## 2025-07-03 DIAGNOSIS — M54.41 CHRONIC BILATERAL LOW BACK PAIN WITH BILATERAL SCIATICA: Primary | ICD-10-CM

## 2025-07-03 DIAGNOSIS — G20.A1 PARKINSON'S DISEASE WITHOUT DYSKINESIA OR FLUCTUATING MANIFESTATIONS (HCC): ICD-10-CM

## 2025-07-03 PROCEDURE — 1123F ACP DISCUSS/DSCN MKR DOCD: CPT | Performed by: INTERNAL MEDICINE

## 2025-07-03 PROCEDURE — G8417 CALC BMI ABV UP PARAM F/U: HCPCS | Performed by: INTERNAL MEDICINE

## 2025-07-03 PROCEDURE — G2211 COMPLEX E/M VISIT ADD ON: HCPCS | Performed by: INTERNAL MEDICINE

## 2025-07-03 PROCEDURE — 1159F MED LIST DOCD IN RCRD: CPT | Performed by: INTERNAL MEDICINE

## 2025-07-03 PROCEDURE — 99214 OFFICE O/P EST MOD 30 MIN: CPT | Performed by: INTERNAL MEDICINE

## 2025-07-03 PROCEDURE — 1036F TOBACCO NON-USER: CPT | Performed by: INTERNAL MEDICINE

## 2025-07-03 PROCEDURE — G8427 DOCREV CUR MEDS BY ELIG CLIN: HCPCS | Performed by: INTERNAL MEDICINE

## 2025-07-03 RX ORDER — GABAPENTIN 100 MG/1
100 CAPSULE ORAL 3 TIMES DAILY
Qty: 90 CAPSULE | Refills: 2 | Status: SHIPPED | OUTPATIENT
Start: 2025-07-03 | End: 2025-10-01

## 2025-07-03 RX ORDER — MUPIROCIN 2 %
OINTMENT (GRAM) TOPICAL
Qty: 30 G | Refills: 0 | Status: SHIPPED | OUTPATIENT
Start: 2025-07-03 | End: 2025-07-10

## 2025-07-03 NOTE — PROGRESS NOTES
[facility]  Internal Medicine  Patient Encounter  Mikhail Gallardo D.O., [facility]        Chief Complaint   Patient presents with    Back Pain     Lower back, [facility] gave Ablation and Cortisone now he feels worse.    Hip Pain     Both above hip bone       HPI: 89 y.o. male seen today with complaints of low back pain and hip pain.  Patient has a long history of chronic low back issues attributed to lumbar spine disease.  Pain generators likely include lumbar degenerative disc disease, spinal stenosis, spondylosis, foraminal stenosis, postlaminectomy syndrome.  Patient was recently seen by [facility].  He was seen on 6/16/2025 just a few weeks ago.  Other pain generators noted include myofascial pain, facet syndrome, sacroiliac joint dysfunction.  He has a remote history of L3-L5 decompression in 2010.  He failed radiofrequency ablation on 5/2/2025 performed by Dr. Blas.  He was given trigger point injections which have not helped much.  .    Previous x-ray of the right hip showed mild arthritis in 2023.      Also, he has additional complaints of right leg wound.  He scraped leg in the garage.        Past Medical History:   Diagnosis Date    Achalasia 04/2019    Dr. Hunt    Acute UTI 07/20/2024    Basal cell cancer     nose, face    Cataract     Colon polyps     Dilated aortic root     First degree AV block 04/12/2021    Gastroesophageal reflux disease without esophagitis 11/19/2018    GERD (gastroesophageal reflux disease)     H/O degenerative disc disease     Hiatal hernia     seen on CT    Hyperlipidemia     Hypertension     Lumbar disc disease     Lumbar spinal stenosis     Lumbar spondylosis     Prostate cancer (HCC) 04/1997    Renal cancer (HCC) 11/2009    Sleep disturbance     Stress incontinence     Thoracic aneurysm     Found to be NL on CT scan 2012.      Urinary retention          MEDICATIONS:  Prior to Visit Medications    Medication Sig   gabapentin (NEURONTIN) 100 MG

## 2025-07-03 NOTE — PATIENT INSTRUCTIONS
Keep wound clean with Dial Soap and water    Pat dry    Place antibiotic on wound twice daily and cover with nonstick gauze pad/    Observe for increasing redness, drainage, pain, fever    Call the office if you do not think the wound is healing or if the wound is getting worse  
Yes

## (undated) DEVICE — UNDERPANTS INCONT XL 45-70IN KNIT SEAMLESS DSGN COLOR-CODED

## (undated) DEVICE — GLOVE ORANGE PI 8   MSG9080

## (undated) DEVICE — 35 ML SYRINGE LUER-LOCK TIP: Brand: MONOJECT

## (undated) DEVICE — GLOVE,SURG,SENSICARE SLT,LF,PF,7: Brand: MEDLINE

## (undated) DEVICE — MEDIA CONTRAST RX ISOVUE-300 61% 30ML VIALS

## (undated) DEVICE — SUTURE PERMA-HAND SZ 2-0 L30IN NONABSORBABLE BLK L26MM SH K833H

## (undated) DEVICE — SUTURE MONOCRYL + SZ 4-0 L18IN ABSRB UD L19MM PS-2 3/8 CIR MCP496G

## (undated) DEVICE — 3M™ IOBAN™ 2 ANTIMICROBIAL INCISE DRAPE 6650EZ: Brand: IOBAN™ 2

## (undated) DEVICE — GAUZE,SPONGE,2"X2",8PLY,STERILE,LF,2'S: Brand: MEDLINE

## (undated) DEVICE — SUTURE PDS + SZ 0 L27IN ABSRB VLT L26MM CT 2 1 2 CIR PDP334H

## (undated) DEVICE — 3M™ TEGADERM™ TRANSPARENT FILM DRESSING FRAME STYLE, 1624W, 2-3/8 IN X 2-3/4 IN (6 CM X 7 CM), 100/CT 4CT/CASE: Brand: 3M™ TEGADERM™

## (undated) DEVICE — INVIEW CLEAR LEGGINGS: Brand: CONVERTORS

## (undated) DEVICE — 450 ML BOTTLE OF 0.05% CHLORHEXIDINE GLUCONATE IN 99.95% STERILE WATER FOR IRRIGATION, USP AND APPLICATOR.: Brand: IRRISEPT ANTIMICROBIAL WOUND LAVAGE

## (undated) DEVICE — AVANOS* EXTENSION SETS: Brand: EXTENSION SET, MINI BORE, 12" LENGTH, 0.50ML VOLUME 25

## (undated) DEVICE — STANDARD HYPODERMIC NEEDLE,POLYPROPYLENE HUB: Brand: MONOJECT

## (undated) DEVICE — CATHETER URETH 18FR BLLN 5CC SIL UNCOATED 2 W F

## (undated) DEVICE — SYRINGE MED 3ML CLR PLAS LUERLOCK CONN VI ACCS INTLNK 15GA

## (undated) DEVICE — DRAINBAG,ANTI-REFLUX TOWER,L/F,2000ML,LL: Brand: MEDLINE

## (undated) DEVICE — BANDAGE,GAUZE,4.5"X4.1YD,STERILE,LF: Brand: MEDLINE

## (undated) DEVICE — SUTURE MONOCRYL + SZ 3-0 L27IN ABSRB UD L26MM SH 1/2 CIR MCP416H

## (undated) DEVICE — ASCOPE 4 CYSTO - STANDARD DEFLECTION: Brand: ASCOPE™ 4 CYSTO

## (undated) DEVICE — TAPE UMB 1/8X30 IN BRAIDED POLYESTER STRL

## (undated) DEVICE — PLUG,CATHETER,DRAINAGE PROTECTOR,TUBE: Brand: MEDLINE

## (undated) DEVICE — SMARTGOWN BREATHABLE SURGICAL GOWN: Brand: CONVERTORS

## (undated) DEVICE — DRAPE UTIL W15XL25IN FAB NONFENESTRATED NONREINFORCED LO

## (undated) DEVICE — SUTURE MONOCRYL + ABSORBABLE MONOFILAMENT 2-0 SH 27 IN VIO  MCP317H

## (undated) DEVICE — APPLICATOR MEDICATED 26 CC SOLUTION HI LT ORNG CHLORAPREP

## (undated) DEVICE — RAZOR PREP TWO SIDED

## (undated) DEVICE — SUTURE PDS + SZ 3 0 L27IN ABSRB VLT L17MM RB 1 1 2 CIR PDP305H

## (undated) DEVICE — TRAY ANES SUPP NO DRUG CUST

## (undated) DEVICE — RESERVOIR,SUCTION,100CC,SILICONE: Brand: MEDLINE

## (undated) DEVICE — DRAPE,UNDERBUTTOCKS,PCH,STERILE: Brand: MEDLINE

## (undated) DEVICE — SYRINGE MED 10ML LUERLOCK TIP W/O SFTY DISP

## (undated) DEVICE — 3M™ STERI-DRAPE™ INCISE DRAPE 1050 (60CM X 45CM): Brand: STERI-DRAPE™

## (undated) DEVICE — BOWL MED L 32OZ PLAS W/ MOLD GRAD EZ OPN PEEL PCH

## (undated) DEVICE — SUTURE VICRYL SZ 4-0 L18IN ABSRB UD L19MM PS-2 3/8 CIR PRIM J496H

## (undated) DEVICE — YANKAUER,OPEN TIP,W/O VENT,STERILE: Brand: MEDLINE INDUSTRIES, INC.

## (undated) DEVICE — LAVH/LAPAROSCOPY DRAPE: Brand: CONVERTORS

## (undated) DEVICE — 3M™ STERI-DRAPE™ INSTRUMENT POUCH 1018: Brand: STERI-DRAPE™

## (undated) DEVICE — CORD ES L12FT BPLR FRCP

## (undated) DEVICE — NEEDLE SPNL 22GA L3.5IN BLK HUB S STL REG WALL FIT STYL

## (undated) DEVICE — MINOR SET UP MHAZ: Brand: MEDLINE INDUSTRIES, INC.

## (undated) DEVICE — Device

## (undated) DEVICE — HOOK RETRACT STERIL 12MM S STL BLNT E STAY LONE STAR

## (undated) DEVICE — GLOVE,SURG,SENSICARE SLT,LF,PF,7.5: Brand: MEDLINE

## (undated) DEVICE — SUTURE PERMAHAND SZ 3-0 L30IN NONABSORBABLE BLK SH L26MM K832H

## (undated) DEVICE — Z INACTIVE STAND MAYO W/O TOWEL CVR STRL

## (undated) DEVICE — LIDOCAINE 2% JELLY UROJET PFS 25X5ML

## (undated) DEVICE — BLADE ES L2.75IN ELASTOMERIC COAT DURABLE BEND UPTO 90DEG

## (undated) DEVICE — CATHETER URETH 12FR BLLN 5CC LTX HYDRGEL 2 W BARDX LUB F

## (undated) DEVICE — LIQUIBAND RAPID ADHESIVE 36/CS 0.8ML: Brand: MEDLINE

## (undated) DEVICE — GAUZE,SPONGE,4"X4",8PLY,STRL,LF,10/TRAY: Brand: MEDLINE

## (undated) DEVICE — DRAIN SURG L49IN DIA1/8IN 10IN H SIL W/O TRCR END PERF

## (undated) DEVICE — SUTURE VICRYL + SZ 3-0 L27IN ABSRB UD L26MM SH 1/2 CIR VCP416H

## (undated) DEVICE — DRAPE,UTILITY,TAPE,15X26,STERILE: Brand: MEDLINE

## (undated) DEVICE — STAPLER EXT SKIN 35 WIDE S STL STPL SQUEEZE HNDL VISISTAT